# Patient Record
Sex: MALE | Race: WHITE | Employment: UNEMPLOYED | URBAN - METROPOLITAN AREA
[De-identification: names, ages, dates, MRNs, and addresses within clinical notes are randomized per-mention and may not be internally consistent; named-entity substitution may affect disease eponyms.]

---

## 2017-10-09 PROBLEM — K05.6 PERIODONTAL DISEASE: Status: ACTIVE | Noted: 2017-10-09

## 2017-10-09 PROBLEM — K02.9 DENTAL CARIES: Status: ACTIVE | Noted: 2017-10-09

## 2019-09-04 ENCOUNTER — APPOINTMENT (OUTPATIENT)
Dept: ULTRASOUND IMAGING | Age: 44
DRG: 660 | End: 2019-09-04
Payer: MEDICARE

## 2019-09-04 ENCOUNTER — HOSPITAL ENCOUNTER (INPATIENT)
Age: 44
LOS: 2 days | Discharge: NURSING, REHAB OR LONG-TERM CARE | DRG: 660 | End: 2019-09-06
Attending: EMERGENCY MEDICINE | Admitting: INTERNAL MEDICINE
Payer: MEDICARE

## 2019-09-04 PROBLEM — N17.9 ACUTE RENAL FAILURE (ARF) (HCC): Status: ACTIVE | Noted: 2019-09-04

## 2019-09-04 LAB
ANION GAP SERPL CALCULATED.3IONS-SCNC: 10 MMOL/L (ref 7–16)
BACTERIA: ABNORMAL /HPF
BASOPHILS ABSOLUTE: 0.06 E9/L (ref 0–0.2)
BASOPHILS RELATIVE PERCENT: 0.7 % (ref 0–2)
BILIRUBIN URINE: NEGATIVE
BLOOD, URINE: NEGATIVE
BUN BLDV-MCNC: 46 MG/DL (ref 6–20)
CALCIUM SERPL-MCNC: 9.2 MG/DL (ref 8.6–10.2)
CHLORIDE BLD-SCNC: 105 MMOL/L (ref 98–107)
CHLORIDE URINE RANDOM: 102 MMOL/L
CLARITY: CLEAR
CO2: 27 MMOL/L (ref 22–29)
COLOR: YELLOW
CREAT SERPL-MCNC: 3.1 MG/DL (ref 0.7–1.2)
CREATININE URINE: 54 MG/DL (ref 40–278)
EOSINOPHIL, URINE: 3 % (ref 0–1)
EOSINOPHILS ABSOLUTE: 0.27 E9/L (ref 0.05–0.5)
EOSINOPHILS RELATIVE PERCENT: 3.2 % (ref 0–6)
GFR AFRICAN AMERICAN: 27
GFR NON-AFRICAN AMERICAN: 22 ML/MIN/1.73
GLUCOSE BLD-MCNC: 87 MG/DL (ref 74–99)
GLUCOSE URINE: NEGATIVE MG/DL
HCT VFR BLD CALC: 39.9 % (ref 37–54)
HEMOGLOBIN: 12.9 G/DL (ref 12.5–16.5)
IMMATURE GRANULOCYTES #: 0.03 E9/L
IMMATURE GRANULOCYTES %: 0.4 % (ref 0–5)
KETONES, URINE: NEGATIVE MG/DL
LEUKOCYTE ESTERASE, URINE: ABNORMAL
LYMPHOCYTES ABSOLUTE: 1.35 E9/L (ref 1.5–4)
LYMPHOCYTES RELATIVE PERCENT: 15.8 % (ref 20–42)
MCH RBC QN AUTO: 29.4 PG (ref 26–35)
MCHC RBC AUTO-ENTMCNC: 32.3 % (ref 32–34.5)
MCV RBC AUTO: 90.9 FL (ref 80–99.9)
MONOCYTES ABSOLUTE: 0.59 E9/L (ref 0.1–0.95)
MONOCYTES RELATIVE PERCENT: 6.9 % (ref 2–12)
NEUTROPHILS ABSOLUTE: 6.26 E9/L (ref 1.8–7.3)
NEUTROPHILS RELATIVE PERCENT: 73 % (ref 43–80)
NITRITE, URINE: NEGATIVE
PDW BLD-RTO: 12.6 FL (ref 11.5–15)
PH UA: 6.5 (ref 5–9)
PLATELET # BLD: 256 E9/L (ref 130–450)
PMV BLD AUTO: 8.2 FL (ref 7–12)
POTASSIUM SERPL-SCNC: 5 MMOL/L (ref 3.5–5)
POTASSIUM, UR: 42.4 MMOL/L
PROTEIN UA: NEGATIVE MG/DL
RBC # BLD: 4.39 E12/L (ref 3.8–5.8)
RBC UA: ABNORMAL /HPF (ref 0–2)
SODIUM BLD-SCNC: 142 MMOL/L (ref 132–146)
SODIUM URINE: 103 MMOL/L
SPECIFIC GRAVITY UA: 1.01 (ref 1–1.03)
UROBILINOGEN, URINE: 0.2 E.U./DL
WBC # BLD: 8.6 E9/L (ref 4.5–11.5)
WBC UA: ABNORMAL /HPF (ref 0–5)

## 2019-09-04 PROCEDURE — 85025 COMPLETE CBC W/AUTO DIFF WBC: CPT

## 2019-09-04 PROCEDURE — 36415 COLL VENOUS BLD VENIPUNCTURE: CPT

## 2019-09-04 PROCEDURE — 2580000003 HC RX 258: Performed by: INTERNAL MEDICINE

## 2019-09-04 PROCEDURE — 76770 US EXAM ABDO BACK WALL COMP: CPT

## 2019-09-04 PROCEDURE — 84133 ASSAY OF URINE POTASSIUM: CPT

## 2019-09-04 PROCEDURE — 84300 ASSAY OF URINE SODIUM: CPT

## 2019-09-04 PROCEDURE — 99284 EMERGENCY DEPT VISIT MOD MDM: CPT

## 2019-09-04 PROCEDURE — 82436 ASSAY OF URINE CHLORIDE: CPT

## 2019-09-04 PROCEDURE — 80048 BASIC METABOLIC PNL TOTAL CA: CPT

## 2019-09-04 PROCEDURE — 87205 SMEAR GRAM STAIN: CPT

## 2019-09-04 PROCEDURE — 82570 ASSAY OF URINE CREATININE: CPT

## 2019-09-04 PROCEDURE — 81001 URINALYSIS AUTO W/SCOPE: CPT

## 2019-09-04 PROCEDURE — 6370000000 HC RX 637 (ALT 250 FOR IP): Performed by: INTERNAL MEDICINE

## 2019-09-04 PROCEDURE — 1200000000 HC SEMI PRIVATE

## 2019-09-04 PROCEDURE — 2580000003 HC RX 258: Performed by: STUDENT IN AN ORGANIZED HEALTH CARE EDUCATION/TRAINING PROGRAM

## 2019-09-04 RX ORDER — OYSTER SHELL CALCIUM WITH VITAMIN D 500; 200 MG/1; [IU]/1
1 TABLET, FILM COATED ORAL 2 TIMES DAILY
COMMUNITY

## 2019-09-04 RX ORDER — BISACODYL 10 MG
10 SUPPOSITORY, RECTAL RECTAL DAILY PRN
Status: DISCONTINUED | OUTPATIENT
Start: 2019-09-04 | End: 2019-09-06 | Stop reason: HOSPADM

## 2019-09-04 RX ORDER — 0.9 % SODIUM CHLORIDE 0.9 %
1000 INTRAVENOUS SOLUTION INTRAVENOUS ONCE
Status: COMPLETED | OUTPATIENT
Start: 2019-09-04 | End: 2019-09-04

## 2019-09-04 RX ORDER — SENNA AND DOCUSATE SODIUM 50; 8.6 MG/1; MG/1
2 TABLET, FILM COATED ORAL DAILY
COMMUNITY

## 2019-09-04 RX ORDER — SENNA AND DOCUSATE SODIUM 50; 8.6 MG/1; MG/1
2 TABLET, FILM COATED ORAL DAILY
Status: DISCONTINUED | OUTPATIENT
Start: 2019-09-04 | End: 2019-09-06 | Stop reason: HOSPADM

## 2019-09-04 RX ORDER — ACETAMINOPHEN 325 MG/1
650 TABLET ORAL EVERY 4 HOURS PRN
Status: DISCONTINUED | OUTPATIENT
Start: 2019-09-04 | End: 2019-09-06 | Stop reason: HOSPADM

## 2019-09-04 RX ORDER — FERROUS SULFATE 325(65) MG
325 TABLET ORAL
Status: DISCONTINUED | OUTPATIENT
Start: 2019-09-05 | End: 2019-09-05

## 2019-09-04 RX ORDER — BISACODYL 10 MG
10 SUPPOSITORY, RECTAL RECTAL DAILY PRN
COMMUNITY

## 2019-09-04 RX ORDER — LEVOTHYROXINE SODIUM 0.07 MG/1
75 TABLET ORAL
Status: DISCONTINUED | OUTPATIENT
Start: 2019-09-05 | End: 2019-09-06 | Stop reason: HOSPADM

## 2019-09-04 RX ORDER — OMEGA-3S/DHA/EPA/FISH OIL/D3 300MG-1000
400 CAPSULE ORAL
Status: DISCONTINUED | OUTPATIENT
Start: 2019-09-05 | End: 2019-09-04 | Stop reason: CLARIF

## 2019-09-04 RX ORDER — ACETAMINOPHEN 325 MG/1
650 TABLET ORAL EVERY 4 HOURS PRN
COMMUNITY

## 2019-09-04 RX ORDER — M-VIT,TX,IRON,MINS/CALC/FOLIC 27MG-0.4MG
1 TABLET ORAL
Status: DISCONTINUED | OUTPATIENT
Start: 2019-09-05 | End: 2019-09-06 | Stop reason: HOSPADM

## 2019-09-04 RX ORDER — ONDANSETRON 2 MG/ML
4 INJECTION INTRAMUSCULAR; INTRAVENOUS EVERY 6 HOURS PRN
Status: DISCONTINUED | OUTPATIENT
Start: 2019-09-04 | End: 2019-09-06 | Stop reason: HOSPADM

## 2019-09-04 RX ORDER — SODIUM CHLORIDE 9 MG/ML
INJECTION, SOLUTION INTRAVENOUS CONTINUOUS
Status: DISCONTINUED | OUTPATIENT
Start: 2019-09-04 | End: 2019-09-06 | Stop reason: HOSPADM

## 2019-09-04 RX ADMIN — SODIUM CHLORIDE: 9 INJECTION, SOLUTION INTRAVENOUS at 14:28

## 2019-09-04 RX ADMIN — OYSTER SHELL CALCIUM WITH VITAMIN D 1 TABLET: 500; 200 TABLET, FILM COATED ORAL at 20:44

## 2019-09-04 RX ADMIN — SODIUM CHLORIDE 1000 ML: 9 INJECTION, SOLUTION INTRAVENOUS at 12:36

## 2019-09-04 SDOH — HEALTH STABILITY: MENTAL HEALTH: HOW OFTEN DO YOU HAVE A DRINK CONTAINING ALCOHOL?: NEVER

## 2019-09-04 ASSESSMENT — PAIN SCALES - WONG BAKER
WONGBAKER_NUMERICALRESPONSE: 0
WONGBAKER_NUMERICALRESPONSE: 0

## 2019-09-04 ASSESSMENT — PAIN SCALES - GENERAL
PAINLEVEL_OUTOF10: 0
PAINLEVEL_OUTOF10: 0

## 2019-09-04 NOTE — ED PROVIDER NOTES
The patient is a 79-year-old male presenting with abnormal labs. Patient was reported to have outpatient labs drawn and was found to have an elevated BUN and creatinine. Patient has a significant past male history of anemia, cerebral palsy, impulse control disorder, MR, thyroid disease. Patient is present with caretaker in room who provides the history. She states patient is verbal only to a few words but otherwise nonresponsive. Does not follow commands. The caretaker denies any recent illness, diarrhea, urinary symptoms, vomiting, changes in appetite. The history is limited by a developmental delay. Review of Systems   Unable to perform ROS: Patient nonverbal        Physical Exam   Constitutional: He is oriented to person, place, and time. He appears well-developed and well-nourished. No distress. HENT:   Head: Normocephalic and atraumatic. Eyes: Pupils are equal, round, and reactive to light. Conjunctivae and EOM are normal.   Neck: Normal range of motion. No JVD present. No thyromegaly present. Cardiovascular: Normal rate, regular rhythm, normal heart sounds and intact distal pulses. Pulmonary/Chest: Effort normal and breath sounds normal. No respiratory distress. He has no wheezes. He exhibits no tenderness. Abdominal: Soft. Bowel sounds are normal. He exhibits no distension and no mass. There is no tenderness. There is no rebound and no guarding. Musculoskeletal: Normal range of motion. Neurological: He is alert and oriented to person, place, and time. Skin: Skin is warm and dry. No rash noted. Psychiatric: He has a normal mood and affect. His behavior is normal.   Nursing note and vitals reviewed.        Procedures     MDM     ED Course as of Sep 05 2341   Wed Sep 04, 2019   1106   ATTENDING PROVIDER ATTESTATION:     I have personally performed and/or participated in the history, exam, medical decision making, and procedures and agree with all pertinent clinical information unless otherwise noted. I have also reviewed and agree with the past medical, family and social history unless otherwise noted. I have discussed this patient in detail with the resident and provided the instruction and education regarding the evidence-based evaluation and treatment of [unfilled]  History: patient sent to the ED with concern for an abnormal lab. His family is not sure of the abnormality and have not noticed any recent issues. Patient is nonverbal.    My findings: Victorino Kocher is a 40 y.o. male whom is in no distress. Physical exam reveals awake and alert male. No obvious pallor. Mucous membranes are moist.  Heart RRR, lungs CTA, abdomen is soft and nontender. He moves all extremities spontaneously. My plan: Symptomatic and supportive care. Will evaluate labs and call the institution he lives in. Electronically signed by Sabine Cortes DO on 9/4/19 at 11:06 AM          [JS]   [phone] Spoke with Issa Law, the nurse at the facility that takes care of the patient, she states that his BUN in June was 19, creatinine 1.2, GFR 66.  Reason the sentiment was his BUN was drawn at 60, creatinine was 5.2 and GFR was 12. [WL]   [phone] with Dr. Eliza Rojas, patient's primary care physician, he states the patient can be brought in and to bring him in under Dr. Thaddeus Leung. [WL]   1150 Creatinine(!): 3.1 [WL]   1150 BUN(!): 46 [WL]   1248 Spoke with Dr. Thaddeus Leung, he agrees to accept the patient for admission.    [WL]      ED Course User Index  [JS] Sabine Cortes DO  [WL] Toya Limon DO      Patient presents to the ED for   Chief Complaint   Patient presents with    Abnormal Lab     patients BUN and Cr are elevated     Chart review was performed. Workup in the ED revealed SONA. Lab work showed creatinine was 3.1, BUN 46. Previous values of 1.2 for creatinine back in June. Values of 5.2 were found as outpatient basis and caused him to come here to the ED.   Patient was started on IV fluids. Patient requires continued workup and management of their symptoms and will be admitted to the hospital for further evaluation and treatment. ED Course as of Sep 05 2341   Wed Sep 04, 2019   1106   ATTENDING PROVIDER ATTESTATION:     I have personally performed and/or participated in the history, exam, medical decision making, and procedures and agree with all pertinent clinical information unless otherwise noted. I have also reviewed and agree with the past medical, family and social history unless otherwise noted. I have discussed this patient in detail with the resident and provided the instruction and education regarding the evidence-based evaluation and treatment of [unfilled]  History: patient sent to the ED with concern for an abnormal lab. His family is not sure of the abnormality and have not noticed any recent issues. Patient is nonverbal.    My findings: Elmer Dodge is a 40 y.o. male whom is in no distress. Physical exam reveals awake and alert male. No obvious pallor. Mucous membranes are moist.  Heart RRR, lungs CTA, abdomen is soft and nontender. He moves all extremities spontaneously. My plan: Symptomatic and supportive care. Will evaluate labs and call the institution he lives in. Electronically signed by Samuel Villaseñor DO on 9/4/19 at 11:06 AM          [JS]   [phone] Spoke with Hilda Santana, the nurse at the facility that takes care of the patient, she states that his BUN in June was 19, creatinine 1.2, GFR 66.  Reason the sentiment was his BUN was drawn at 60, creatinine was 5.2 and GFR was 12. [WL]   [phone] with Dr. Casandra De Los Santos, patient's primary care physician, he states the patient can be brought in and to bring him in under Dr. Criss Varma.     [WL]   1150 Creatinine(!): 3.1 [WL]   1150 BUN(!): 46 [WL]   1248 Spoke with Dr. Criss Varma, he agrees to accept the patient for admission.    [WL]      ED Course User Index  [JS] Samuel Villaseñor Glucose 87 74 - 99 mg/dL    BUN 46 (H) 6 - 20 mg/dL    CREATININE 3.1 (H) 0.7 - 1.2 mg/dL    GFR Non-African American 22 >=60 mL/min/1.73    GFR African American 27     Calcium 9.2 8.6 - 10.2 mg/dL   Urinalysis with Microscopic   Result Value Ref Range    Color, UA Yellow Straw/Yellow    Clarity, UA Clear Clear    Glucose, Ur Negative Negative mg/dL    Bilirubin Urine Negative Negative    Ketones, Urine Negative Negative mg/dL    Specific Gravity, UA 1.010 1.005 - 1.030    Blood, Urine Negative Negative    pH, UA 6.5 5.0 - 9.0    Protein, UA Negative Negative mg/dL    Urobilinogen, Urine 0.2 <2.0 E.U./dL    Nitrite, Urine Negative Negative    Leukocyte Esterase, Urine SMALL (A) Negative    WBC, UA 0-1 0 - 5 /HPF    RBC, UA 1-3 0 - 2 /HPF    Bacteria, UA RARE (A) /HPF   Eosinophil smear urine   Result Value Ref Range    Eosinophil, Urine 3 (H) 0 - 1 %   Creatinine, urine, random   Result Value Ref Range    Creatinine, Ur 54 40 - 278 mg/dL   Urine electrolytes   Result Value Ref Range    Sodium, Ur 103 Not Established mmol/L    Potassium, Ur 42.4 Not Established mmol/L    Chloride 102 Not Established mmol/L   TSH without Reflex   Result Value Ref Range    TSH 7.840 (H) 0.270 - 4.200 uIU/mL   Lipid Panel   Result Value Ref Range    Cholesterol, Total 165 0 - 199 mg/dL    Triglycerides 69 0 - 149 mg/dL    HDL 44 >40 mg/dL    LDL Calculated 107 (H) 0 - 99 mg/dL    VLDL Cholesterol Calculated 14 mg/dL   Phosphorus   Result Value Ref Range    Phosphorus 2.4 (L) 2.5 - 4.5 mg/dL   CBC Auto Differential   Result Value Ref Range    WBC 8.0 4.5 - 11.5 E9/L    RBC 4.47 3.80 - 5.80 E12/L    Hemoglobin 13.1 12.5 - 16.5 g/dL    Hematocrit 40.5 37.0 - 54.0 %    MCV 90.6 80.0 - 99.9 fL    MCH 29.3 26.0 - 35.0 pg    MCHC 32.3 32.0 - 34.5 %    RDW 12.5 11.5 - 15.0 fL    Platelets 947 077 - 898 E9/L    MPV 8.5 7.0 - 12.0 fL    Neutrophils % 69.4 43.0 - 80.0 %    Immature Granulocytes % 0.4 0.0 - 5.0 %    Lymphocytes % 21.2 20.0 - 42.0 % 09/05/19 1437 103/74 -- -- 67 17 93 %   09/05/19 1428 100/67 -- -- 74 17 97 %   09/05/19 1420 126/85 -- -- 69 25 97 %   09/05/19 1415 126/85 97 °F (36.1 °C) Temporal 77 16 97 %   09/05/19 0720 (!) 134/93 98 °F (36.7 °C) -- 81 18 97 %   09/05/19 0000 -- -- -- -- -- 98 %           ------------------------------------------ PROGRESS NOTES ------------------------------------------  Re-evaluation(s):  Time: 11:41 PM   Patients symptoms show no change  Repeat physical examination is unchanged, patient laying comfortably in the bed. Counseling:  I have spoken with the patient/family members and discussed todays results, in addition to providing specific details for the plan of care and counseling regarding the diagnosis and prognosis. Their questions are answered at this time and they are agreeable with the plan of admission. This patient has remained hemodynamically stable during their ED course. Diagnosis:  1.  SONA (acute kidney injury) ([address]        Disposition:  Patient's disposition: Arlet Canales DO  Resident  09/05/19 0068

## 2019-09-04 NOTE — CONSULTS
Patient seen and examined. Consult dictated. Dr. Marie Sibley , Thank You for allowing me to participate in the care of this patient. Will follow the patient with you.     Claudia Badillo MD  Nephrology    Electronically signed by Richar Weiss MD on 9/4/2019 at 4:09 PM

## 2019-09-04 NOTE — H&P
[facility]  Internal Medicine  -Resident History & Physical NoteJohn Rodriguez / ELADIO 1975 / MRN [id number] /  / Ghislaine Delgadillo 2019    PCP:  Susan Bernal DO  Admitting Physician:  No admitting provider for patient encounter. Consultants:  None at this time   Chief Complaint   Patient presents with    Abnormal Lab     patients BUN and Cr are elevated      History of Present Illness  Lawyer Mahmood is a 40 y.o. male with a past medical history pertinent for cerebral palsy, profound mental retardation, hypothyroidism, and chronic anemia [facility] to elevated creatinine on outpatient labs. Patient is mostly non-verbal and unable to provide a history. Patient's mother at the bedside to provide history. She reports that the patient has been asymptomatic aside from increased arthritis pain and difficultly ambulating. He had routine labs drawn as an outpatient showing a significantly elevated creatinine. Patient has been taking naproxen 500mg daily for several years due to his arthritis. Patient has had no vomiting or diarrhea, and his appetite is good with no decreased intake recently. Patient's father sees Dr. Lorena James for nephrology and his mother has requested he be consulted if needed as he is known to their family. Histories / Home Medications / Allergies  Past Medical History:   Diagnosis Date    Anemia     Cerebral palsy ([address]     Impulse control disorder     Profound mental retardation     Thyroid disease      Past Surgical History:   Procedure Laterality Date    DENTAL SURGERY  10/09/2017    dental restorations/Dr. Ravi Poole     History reviewed. No pertinent family history.   Social History     Socioeconomic History    Marital status: Single     Spouse name: Not on file    Number of children: Not on file    Years of education: Not on file    Highest education level: Not on file   Occupational History    Not on file   Social Needs    Financial resource strain: Not on file    Food insecurity:     Worry: Not on file     Inability: Not on file    Transportation needs:     Medical: Not on file     Non-medical: Not on file   Tobacco Use    Smoking status: Never Smoker    Smokeless tobacco: Never Used   Substance and Sexual Activity    Alcohol use: Never     Frequency: Never    Drug use: Never    Sexual activity: Never   Lifestyle    Physical activity:     Days per week: Not on file     Minutes per session: Not on file    Stress: Not on file   Relationships    Social connections:     Talks on phone: Not on file     Gets together: Not on file     Attends Holiness service: Not on file     Active member of club or organization: Not on file     Attends meetings of clubs or organizations: Not on file     Relationship status: Not on file    Intimate partner violence:     Fear of current or ex partner: Not on file     Emotionally abused: Not on file     Physically abused: Not on file     Forced sexual activity: Not on file   Other Topics Concern    Not on file   Social History Narrative    Not on file     Prior to Admission medications    Medication Sig Start Date End Date Taking?  Authorizing Provider   calcium-vitamin D (OSCAL-500) 500-200 MG-UNIT per tablet Take 1 tablet by mouth 2 times daily   Yes Historical Provider, MD   sennosides-docusate sodium (SENOKOT-S) 8.6-50 MG tablet Take 2 tablets by mouth daily   Yes Historical Provider, MD   acetaminophen (TYLENOL) 325 MG tablet Take 650 mg by mouth every 4 hours as needed for Pain   Yes Historical Provider, MD   bisacodyl (DULCOLAX) 10 MG suppository Place 10 mg rectally daily as needed for Constipation   Yes Historical Provider, MD   levothyroxine (SYNTHROID) 75 MCG tablet Take 75 mcg by mouth every morning (before breakfast)    Yes Historical Provider, MD   naproxen (NAPROSYN) 500 MG tablet Take 500 mg by mouth every morning (before breakfast)    Yes Historical Provider, MD   Multiple Vitamin (MULTI VITAMIN) TABS Take 1 tablet by mouth every morning (before breakfast)    Yes Historical Provider, MD   vitamin D3 (CHOLECALCIFEROL) 400 units TABS tablet Take 400 Units by mouth every morning (before breakfast)    Yes Historical Provider, MD   ferrous sulfate 325 (65 Fe) MG tablet Take 325 mg by mouth daily (with breakfast)   Yes Historical Provider, MD   magnesium hydroxide (MILK OF MAGNESIA) 400 MG/5ML suspension Take 30 mLs by mouth daily as needed for Constipation   Yes Historical Provider, MD     Allergies: Erythromycin and Macrolides and ketolides    Review of Systems  All bolded are positive; please see HPI  Unable to obtain as patient is non-verbal and non-communicative    Physical Examination  BP (!) 139/116   Pulse 76   Temp 97.1 °F (36.2 °C) (Axillary)   Resp 18   Ht 5' 6\" (1.676 m)   Wt 154 lb (69.9 kg)   SpO2 99%   BMI 24.86 kg/m²   General: patient is awake, alert, and at his baseline functional status - nonverbal; they appear stated age and are cooperative during the examination; they are in no apparent distress and do not exhibit any labored breathing at this time  HEENT: unremarkable  Neck: supple with trachea midline and without obvious JVD or bruit  Cardiac: regular rate and rhythm without obvious murmur, rub, or gallop  Lungs: clear to auscultation bilaterally without wheezes, rhonchi, or rales  Abdomen: soft, nontender, and nondistended with normal active bowel sounds and without organomegaly  Extremities: atraumatic, without ulcers or edema  Neurologic: mental status is as above, cranial nerves are grossly intact, the patient moves all extremities spontaneously, and no focal deficits are appreciated on exam    Recent vitals, labs, and imaging results have been reviewed and are available in the electronic medical record. Assessment and Plan  Patient is a 40 y.o. male who presented with abnormal labs.   The active problem list is as follows:    Acute renal failure  Hx of cerebral palsy   Profound mental retardation   Hypothyroidism  Arthritis    -admitted to [facility]  -IV fluids, NS at 125ml/hr  -renal US  -urine electrolytes  -nephrology consulted  -strict I/Os  -home medications reviewed    · DVT prophylaxis with heparin. · The case and plan of care were discussed with Dr. Edward Murcia. Sonny Garcia DO, PGY3  9/4/2019  1:02 PM    The chart was reviewed and the patient was seen and examined with the resident. The case was discussed in detail with the resident and agree with current impressions and plan. Case was discussed with the patient's family at the bedside. Consult Dr. Josiah Mojica who follows up with the patient's father.     Liliya Kline D.O.  2:34 PM  9/4/2019

## 2019-09-05 ENCOUNTER — ANESTHESIA EVENT (OUTPATIENT)
Dept: OPERATING ROOM | Age: 44
DRG: 660 | End: 2019-09-05
Payer: MEDICARE

## 2019-09-05 ENCOUNTER — APPOINTMENT (OUTPATIENT)
Dept: GENERAL RADIOLOGY | Age: 44
DRG: 660 | End: 2019-09-05
Payer: MEDICARE

## 2019-09-05 ENCOUNTER — APPOINTMENT (OUTPATIENT)
Dept: CT IMAGING | Age: 44
DRG: 660 | End: 2019-09-05
Payer: MEDICARE

## 2019-09-05 ENCOUNTER — ANESTHESIA (OUTPATIENT)
Dept: OPERATING ROOM | Age: 44
DRG: 660 | End: 2019-09-05
Payer: MEDICARE

## 2019-09-05 VITALS
SYSTOLIC BLOOD PRESSURE: 102 MMHG | DIASTOLIC BLOOD PRESSURE: 77 MMHG | RESPIRATION RATE: 19 BRPM | OXYGEN SATURATION: 98 %

## 2019-09-05 LAB
ANION GAP SERPL CALCULATED.3IONS-SCNC: 11 MMOL/L (ref 7–16)
BASOPHILS ABSOLUTE: 0.05 E9/L (ref 0–0.2)
BASOPHILS RELATIVE PERCENT: 0.6 % (ref 0–2)
BUN BLDV-MCNC: 17 MG/DL (ref 6–20)
CALCIUM SERPL-MCNC: 8.7 MG/DL (ref 8.6–10.2)
CHLORIDE BLD-SCNC: 104 MMOL/L (ref 98–107)
CHOLESTEROL, TOTAL: 165 MG/DL (ref 0–199)
CO2: 24 MMOL/L (ref 22–29)
CREAT SERPL-MCNC: 1.2 MG/DL (ref 0.7–1.2)
EOSINOPHILS ABSOLUTE: 0.16 E9/L (ref 0.05–0.5)
EOSINOPHILS RELATIVE PERCENT: 2 % (ref 0–6)
GFR AFRICAN AMERICAN: >60
GFR NON-AFRICAN AMERICAN: >60 ML/MIN/1.73
GLUCOSE BLD-MCNC: 97 MG/DL (ref 74–99)
HCT VFR BLD CALC: 40.5 % (ref 37–54)
HDLC SERPL-MCNC: 44 MG/DL
HEMOGLOBIN: 13.1 G/DL (ref 12.5–16.5)
IMMATURE GRANULOCYTES #: 0.03 E9/L
IMMATURE GRANULOCYTES %: 0.4 % (ref 0–5)
LDL CHOLESTEROL CALCULATED: 107 MG/DL (ref 0–99)
LYMPHOCYTES ABSOLUTE: 1.7 E9/L (ref 1.5–4)
LYMPHOCYTES RELATIVE PERCENT: 21.2 % (ref 20–42)
MCH RBC QN AUTO: 29.3 PG (ref 26–35)
MCHC RBC AUTO-ENTMCNC: 32.3 % (ref 32–34.5)
MCV RBC AUTO: 90.6 FL (ref 80–99.9)
MONOCYTES ABSOLUTE: 0.51 E9/L (ref 0.1–0.95)
MONOCYTES RELATIVE PERCENT: 6.4 % (ref 2–12)
NEUTROPHILS ABSOLUTE: 5.58 E9/L (ref 1.8–7.3)
NEUTROPHILS RELATIVE PERCENT: 69.4 % (ref 43–80)
PDW BLD-RTO: 12.5 FL (ref 11.5–15)
PHOSPHORUS: 2.4 MG/DL (ref 2.5–4.5)
PLATELET # BLD: 250 E9/L (ref 130–450)
PMV BLD AUTO: 8.5 FL (ref 7–12)
POTASSIUM SERPL-SCNC: 4 MMOL/L (ref 3.5–5)
RBC # BLD: 4.47 E12/L (ref 3.8–5.8)
SODIUM BLD-SCNC: 139 MMOL/L (ref 132–146)
T4 FREE: 1.25 NG/DL (ref 0.93–1.7)
TRIGL SERPL-MCNC: 69 MG/DL (ref 0–149)
TSH SERPL DL<=0.05 MIU/L-ACNC: 7.84 UIU/ML (ref 0.27–4.2)
VLDLC SERPL CALC-MCNC: 14 MG/DL
WBC # BLD: 8 E9/L (ref 4.5–11.5)

## 2019-09-05 PROCEDURE — 80061 LIPID PANEL: CPT

## 2019-09-05 PROCEDURE — 6360000002 HC RX W HCPCS: Performed by: NURSE ANESTHETIST, CERTIFIED REGISTERED

## 2019-09-05 PROCEDURE — 85025 COMPLETE CBC W/AUTO DIFF WBC: CPT

## 2019-09-05 PROCEDURE — 36415 COLL VENOUS BLD VENIPUNCTURE: CPT

## 2019-09-05 PROCEDURE — 2709999900 HC NON-CHARGEABLE SUPPLY: Performed by: UROLOGY

## 2019-09-05 PROCEDURE — BT141ZZ FLUOROSCOPY OF KIDNEYS, URETERS AND BLADDER USING LOW OSMOLAR CONTRAST: ICD-10-PCS | Performed by: UROLOGY

## 2019-09-05 PROCEDURE — 1200000000 HC SEMI PRIVATE

## 2019-09-05 PROCEDURE — C1894 INTRO/SHEATH, NON-LASER: HCPCS | Performed by: UROLOGY

## 2019-09-05 PROCEDURE — 74400 UROGRAPHY IV +-KUB TOMOG: CPT

## 2019-09-05 PROCEDURE — 6360000002 HC RX W HCPCS: Performed by: UROLOGY

## 2019-09-05 PROCEDURE — 6360000002 HC RX W HCPCS: Performed by: INTERNAL MEDICINE

## 2019-09-05 PROCEDURE — 2500000003 HC RX 250 WO HCPCS: Performed by: NURSE ANESTHETIST, CERTIFIED REGISTERED

## 2019-09-05 PROCEDURE — 7100000001 HC PACU RECOVERY - ADDTL 15 MIN: Performed by: UROLOGY

## 2019-09-05 PROCEDURE — 6370000000 HC RX 637 (ALT 250 FOR IP): Performed by: UROLOGY

## 2019-09-05 PROCEDURE — 7100000000 HC PACU RECOVERY - FIRST 15 MIN: Performed by: UROLOGY

## 2019-09-05 PROCEDURE — 84100 ASSAY OF PHOSPHORUS: CPT

## 2019-09-05 PROCEDURE — 2720000010 HC SURG SUPPLY STERILE: Performed by: UROLOGY

## 2019-09-05 PROCEDURE — 80048 BASIC METABOLIC PNL TOTAL CA: CPT

## 2019-09-05 PROCEDURE — 3700000000 HC ANESTHESIA ATTENDED CARE: Performed by: UROLOGY

## 2019-09-05 PROCEDURE — 0TF68ZZ FRAGMENTATION IN RIGHT URETER, VIA NATURAL OR ARTIFICIAL OPENING ENDOSCOPIC: ICD-10-PCS | Performed by: UROLOGY

## 2019-09-05 PROCEDURE — 84439 ASSAY OF FREE THYROXINE: CPT

## 2019-09-05 PROCEDURE — 3600000003 HC SURGERY LEVEL 3 BASE: Performed by: UROLOGY

## 2019-09-05 PROCEDURE — 74176 CT ABD & PELVIS W/O CONTRAST: CPT

## 2019-09-05 PROCEDURE — 84443 ASSAY THYROID STIM HORMONE: CPT

## 2019-09-05 PROCEDURE — C2617 STENT, NON-COR, TEM W/O DEL: HCPCS | Performed by: UROLOGY

## 2019-09-05 PROCEDURE — C1769 GUIDE WIRE: HCPCS | Performed by: UROLOGY

## 2019-09-05 PROCEDURE — 3700000001 HC ADD 15 MINUTES (ANESTHESIA): Performed by: UROLOGY

## 2019-09-05 PROCEDURE — C1758 CATHETER, URETERAL: HCPCS | Performed by: UROLOGY

## 2019-09-05 PROCEDURE — 2580000003 HC RX 258: Performed by: NURSE ANESTHETIST, CERTIFIED REGISTERED

## 2019-09-05 PROCEDURE — 0T788DZ DILATION OF BILATERAL URETERS WITH INTRALUMINAL DEVICE, VIA NATURAL OR ARTIFICIAL OPENING ENDOSCOPIC: ICD-10-PCS | Performed by: UROLOGY

## 2019-09-05 PROCEDURE — 3600000013 HC SURGERY LEVEL 3 ADDTL 15MIN: Performed by: UROLOGY

## 2019-09-05 DEVICE — UNIVERSA FIRM URETERAL STENT AND POSITIONER WITH HYDROPHILIC COATING
Type: IMPLANTABLE DEVICE | Status: FUNCTIONAL
Brand: UNIVERSA

## 2019-09-05 RX ORDER — PROMETHAZINE HYDROCHLORIDE 25 MG/ML
25 INJECTION, SOLUTION INTRAMUSCULAR; INTRAVENOUS EVERY 6 HOURS PRN
Status: DISCONTINUED | OUTPATIENT
Start: 2019-09-05 | End: 2019-09-06 | Stop reason: HOSPADM

## 2019-09-05 RX ORDER — ATROPA BELLADONNA AND OPIUM 16.2; 6 MG/1; MG/1
SUPPOSITORY RECTAL PRN
Status: DISCONTINUED | OUTPATIENT
Start: 2019-09-05 | End: 2019-09-05 | Stop reason: HOSPADM

## 2019-09-05 RX ORDER — MIDAZOLAM HYDROCHLORIDE 1 MG/ML
INJECTION INTRAMUSCULAR; INTRAVENOUS PRN
Status: DISCONTINUED | OUTPATIENT
Start: 2019-09-05 | End: 2019-09-05 | Stop reason: SDUPTHER

## 2019-09-05 RX ORDER — PROPOFOL 10 MG/ML
INJECTION, EMULSION INTRAVENOUS CONTINUOUS PRN
Status: DISCONTINUED | OUTPATIENT
Start: 2019-09-05 | End: 2019-09-05 | Stop reason: SDUPTHER

## 2019-09-05 RX ORDER — FENTANYL CITRATE 50 UG/ML
INJECTION, SOLUTION INTRAMUSCULAR; INTRAVENOUS PRN
Status: DISCONTINUED | OUTPATIENT
Start: 2019-09-05 | End: 2019-09-05 | Stop reason: SDUPTHER

## 2019-09-05 RX ORDER — MORPHINE SULFATE 2 MG/ML
2 INJECTION, SOLUTION INTRAMUSCULAR; INTRAVENOUS EVERY 5 MIN PRN
Status: DISCONTINUED | OUTPATIENT
Start: 2019-09-05 | End: 2019-09-05 | Stop reason: HOSPADM

## 2019-09-05 RX ORDER — KETOROLAC TROMETHAMINE 30 MG/ML
INJECTION, SOLUTION INTRAMUSCULAR; INTRAVENOUS PRN
Status: DISCONTINUED | OUTPATIENT
Start: 2019-09-05 | End: 2019-09-05 | Stop reason: SDUPTHER

## 2019-09-05 RX ORDER — LIDOCAINE HYDROCHLORIDE 20 MG/ML
INJECTION, SOLUTION INFILTRATION; PERINEURAL PRN
Status: DISCONTINUED | OUTPATIENT
Start: 2019-09-05 | End: 2019-09-05 | Stop reason: SDUPTHER

## 2019-09-05 RX ORDER — HYDROMORPHONE HYDROCHLORIDE 1 MG/ML
0.5 INJECTION, SOLUTION INTRAMUSCULAR; INTRAVENOUS; SUBCUTANEOUS EVERY 5 MIN PRN
Status: DISCONTINUED | OUTPATIENT
Start: 2019-09-05 | End: 2019-09-05 | Stop reason: HOSPADM

## 2019-09-05 RX ORDER — CIPROFLOXACIN 2 MG/ML
INJECTION, SOLUTION INTRAVENOUS
Status: COMPLETED
Start: 2019-09-05 | End: 2019-09-05

## 2019-09-05 RX ORDER — PROMETHAZINE HYDROCHLORIDE 25 MG/ML
6.25 INJECTION, SOLUTION INTRAMUSCULAR; INTRAVENOUS
Status: DISCONTINUED | OUTPATIENT
Start: 2019-09-05 | End: 2019-09-05 | Stop reason: HOSPADM

## 2019-09-05 RX ORDER — CIPROFLOXACIN 2 MG/ML
400 INJECTION, SOLUTION INTRAVENOUS
Status: COMPLETED | OUTPATIENT
Start: 2019-09-05 | End: 2019-09-05

## 2019-09-05 RX ORDER — KETAMINE HYDROCHLORIDE 10 MG/ML
INJECTION, SOLUTION INTRAMUSCULAR; INTRAVENOUS PRN
Status: DISCONTINUED | OUTPATIENT
Start: 2019-09-05 | End: 2019-09-05 | Stop reason: SDUPTHER

## 2019-09-05 RX ORDER — PHENAZOPYRIDINE HYDROCHLORIDE 100 MG/1
100 TABLET, FILM COATED ORAL
Status: DISCONTINUED | OUTPATIENT
Start: 2019-09-05 | End: 2019-09-06 | Stop reason: HOSPADM

## 2019-09-05 RX ORDER — SODIUM CHLORIDE, SODIUM LACTATE, POTASSIUM CHLORIDE, CALCIUM CHLORIDE 600; 310; 30; 20 MG/100ML; MG/100ML; MG/100ML; MG/100ML
INJECTION, SOLUTION INTRAVENOUS CONTINUOUS PRN
Status: DISCONTINUED | OUTPATIENT
Start: 2019-09-05 | End: 2019-09-05 | Stop reason: SDUPTHER

## 2019-09-05 RX ORDER — MEPERIDINE HYDROCHLORIDE 50 MG/ML
12.5 INJECTION INTRAMUSCULAR; INTRAVENOUS; SUBCUTANEOUS EVERY 5 MIN PRN
Status: DISCONTINUED | OUTPATIENT
Start: 2019-09-05 | End: 2019-09-05 | Stop reason: HOSPADM

## 2019-09-05 RX ORDER — TAMSULOSIN HYDROCHLORIDE 0.4 MG/1
0.4 CAPSULE ORAL DAILY
Status: DISCONTINUED | OUTPATIENT
Start: 2019-09-05 | End: 2019-09-06 | Stop reason: HOSPADM

## 2019-09-05 RX ORDER — ONDANSETRON 2 MG/ML
4 INJECTION INTRAMUSCULAR; INTRAVENOUS
Status: DISCONTINUED | OUTPATIENT
Start: 2019-09-05 | End: 2019-09-05 | Stop reason: HOSPADM

## 2019-09-05 RX ORDER — MIDAZOLAM HYDROCHLORIDE 1 MG/ML
INJECTION INTRAMUSCULAR; INTRAVENOUS
Status: COMPLETED
Start: 2019-09-05 | End: 2019-09-05

## 2019-09-05 RX ORDER — PROPOFOL 10 MG/ML
INJECTION, EMULSION INTRAVENOUS PRN
Status: DISCONTINUED | OUTPATIENT
Start: 2019-09-05 | End: 2019-09-05 | Stop reason: SDUPTHER

## 2019-09-05 RX ADMIN — ONDANSETRON 4 MG: 2 INJECTION INTRAMUSCULAR; INTRAVENOUS at 21:17

## 2019-09-05 RX ADMIN — PROPOFOL 40 MG: 10 INJECTION, EMULSION INTRAVENOUS at 12:58

## 2019-09-05 RX ADMIN — PROMETHAZINE HYDROCHLORIDE 25 MG: 25 INJECTION INTRAMUSCULAR; INTRAVENOUS at 22:39

## 2019-09-05 RX ADMIN — FENTANYL CITRATE 50 MCG: 50 INJECTION, SOLUTION INTRAMUSCULAR; INTRAVENOUS at 13:14

## 2019-09-05 RX ADMIN — MIDAZOLAM 2 MG: 1 INJECTION INTRAMUSCULAR; INTRAVENOUS at 12:40

## 2019-09-05 RX ADMIN — KETOROLAC TROMETHAMINE 30 MG: 30 INJECTION, SOLUTION INTRAMUSCULAR; INTRAVENOUS at 13:59

## 2019-09-05 RX ADMIN — MIDAZOLAM 2 MG: 1 INJECTION INTRAMUSCULAR; INTRAVENOUS at 12:35

## 2019-09-05 RX ADMIN — LIDOCAINE HYDROCHLORIDE 20 MG: 20 INJECTION, SOLUTION INFILTRATION; PERINEURAL at 12:58

## 2019-09-05 RX ADMIN — TAMSULOSIN HYDROCHLORIDE 0.4 MG: 0.4 CAPSULE ORAL at 15:56

## 2019-09-05 RX ADMIN — ACETAMINOPHEN 650 MG: 325 TABLET, FILM COATED ORAL at 15:56

## 2019-09-05 RX ADMIN — PROPOFOL 100 MCG/KG/MIN: 10 INJECTION, EMULSION INTRAVENOUS at 12:58

## 2019-09-05 RX ADMIN — KETAMINE HYDROCHLORIDE 30 MG: 10 INJECTION, SOLUTION INTRAMUSCULAR; INTRAVENOUS at 12:52

## 2019-09-05 RX ADMIN — OYSTER SHELL CALCIUM WITH VITAMIN D 1 TABLET: 500; 200 TABLET, FILM COATED ORAL at 20:32

## 2019-09-05 RX ADMIN — SODIUM CHLORIDE, POTASSIUM CHLORIDE, SODIUM LACTATE AND CALCIUM CHLORIDE: 600; 310; 30; 20 INJECTION, SOLUTION INTRAVENOUS at 12:57

## 2019-09-05 RX ADMIN — CIPROFLOXACIN 400 MG: 2 INJECTION, SOLUTION INTRAVENOUS at 12:57

## 2019-09-05 RX ADMIN — FENTANYL CITRATE 50 MCG: 50 INJECTION, SOLUTION INTRAMUSCULAR; INTRAVENOUS at 13:05

## 2019-09-05 RX ADMIN — PHENAZOPYRIDINE HYDROCHLORIDE 100 MG: 100 TABLET ORAL at 17:19

## 2019-09-05 ASSESSMENT — PULMONARY FUNCTION TESTS
PIF_VALUE: 1

## 2019-09-05 ASSESSMENT — LIFESTYLE VARIABLES: SMOKING_STATUS: 0

## 2019-09-05 ASSESSMENT — PAIN SCALES - WONG BAKER
WONGBAKER_NUMERICALRESPONSE: 2
WONGBAKER_NUMERICALRESPONSE: 0

## 2019-09-05 ASSESSMENT — PAIN SCALES - GENERAL
PAINLEVEL_OUTOF10: 0
PAINLEVEL_OUTOF10: 0
PAINLEVEL_OUTOF10: 3
PAINLEVEL_OUTOF10: 0
PAINLEVEL_OUTOF10: 0

## 2019-09-05 ASSESSMENT — PAIN DESCRIPTION - LOCATION
LOCATION: GROIN
LOCATION: GROIN

## 2019-09-05 ASSESSMENT — PAIN DESCRIPTION - PAIN TYPE
TYPE: SURGICAL PAIN
TYPE: SURGICAL PAIN

## 2019-09-05 NOTE — BRIEF OP NOTE
Brief Postoperative Note  ______________________________________________________________    Patient: Josy Headley  YOB: 1975  MRN: [id number]  Date of Procedure: 9/5/2019    Pre-Op Diagnosis: OBSTRUCTIVE UROPATHY/azotemia/bilateral obstructing ureteral calculi    Post-Op Diagnosis: 12mm stone impacted at right pelvic brim/5mm sone left proximal ureter       Procedure(s):  CYSTOSCOPY URETEROSCOPY RETROGRADE PYELOGRAMS BILATERAL STENT INSERTION/right ureteroscopy right laser lithotripsy    Anesthesia: Monitored anesthesia and a B&O suppository    Surgeon(s):  Omid Santacruz MD    Assistant:     Estimated Blood Loss (mL):    Complications: Stone impacted at the pelvic brim right could not get by it with a stent I had to break the stone with ureteroscopy and laser lithotripsy in order to get into the proximal ureter and place a stent.   Pt combative after IV versed in preop  Finally sedated with ketamine    Specimens:   Urine for culture from the bladder    Implants:none        Drains:  Bilateral universa stents    Findings: As above    Omid Santacruz MD  Date: 9/5/2019  Time: 9:59 AM

## 2019-09-05 NOTE — PROGRESS NOTES
[facility]  Internal Medicine  -Proress Note-     Compa Nichols /  1975 / MRN [id number] / [id number] / Kofi Jesus 2019    PCP:  Naresh Ramsey DO  Admitting Physician:  Tru Horowitz DO  Consultants:  None at this time   Chief Complaint   Patient presents with    Abnormal Lab     patients BUN and Cr are elevated      History of Present Illness  Compa Nichols is a 40 y.o. male with a past medical history pertinent for cerebral palsy, profound mental retardation, hypothyroidism, and chronic anemia who presents to [facility] due to elevated creatinine on outpatient labs. Patient is mostly non-verbal and unable to provide a history. Patient's mother at the bedside to provide history. She reports that the patient has been asymptomatic aside from increased arthritis pain and difficultly ambulating. He had routine labs drawn as an outpatient showing a significantly elevated creatinine. Patient has been taking naproxen 500mg daily for several years due to his arthritis. Patient has had no vomiting or diarrhea, and his appetite is good with no decreased intake recently. Patient's father sees Dr. Ailin Brown for nephrology and his mother has requested he be consulted if needed as he is known to their family. 2018- patient was seen and examined on general medical floor. Case discussed with patient's mother at the bedside. According to the mother the patient seems to be doing better today and seems to be close to his baseline. Renal ultrasound last night showed right hydronephrosis. Urology was consulted. Vital signs are stable and blood pressure currently improved at 134/93 with a heart rate of 81 and temperature is 98.3. Patient BUN/creatinine is dramatically improved and currently 17/1.2 with IV fluids. TSH was 7.8 with a WBC of 8 and hemoglobin 13.1.         Histories / Home Medications / Allergies  Past Medical History:   Diagnosis Date    Anemia     Arthritis     [address]bral pals[facility] ([facility]     Impulse control disorder     Profound mental retardation     Thyroid disease      Past Surgical History:   Procedure Laterality Date    DENTAL SURGERY  10/09/2017    dental restorations/Dr. Kathie Burk     History reviewed. No pertinent family history. Social History     Socioeconomic History    Marital status: Single     Spouse name: Not on file    Number of children: Not on file    Years of education: Not on file    Highest education level: Not on file   Occupational History    Not on file   Social Needs    Financial resource strain: Not on file    Food insecurity:     Worry: Not on file     Inability: Not on file    Transportation needs:     Medical: Not on file     Non-medical: Not on file   Tobacco Use    Smoking status: Never Smoker    Smokeless tobacco: Never Used   Substance and Sexual Activity    Alcohol use: Never     Frequency: Never    Drug use: Never    Sexual activity: Never   Lifestyle    Physical activity:     Days per week: Not on file     Minutes per session: Not on file    Stress: Not on file   Relationships    Social connections:     Talks on phone: Not on file     Gets together: Not on file     Attends Congregation service: Not on file     Active member of club or organization: Not on file     Attends meetings of clubs or organizations: Not on file     Relationship status: Not on file    Intimate partner violence:     Fear of current or ex partner: Not on file     Emotionally abused: Not on file     Physically abused: Not on file     Forced sexual activity: Not on file   Other Topics Concern    Not on file   Social History Narrative    Not on file     Prior to Admission medications    Medication Sig Start Date End Date Taking?  Authorizing Provider   calcium-vitamin D (OSCAL-500) 500-200 MG-UNIT per tablet Take 1 tablet by mouth 2 times daily   Yes Historical Provider, MD saenznosides-docusate sodium (SENOKOT-S) 8.6-50 MG tablet Take 2 tablets by mouth daily   Yes Historical Provider, MD   acetaminophen (TYLENOL) 325 MG tablet Take 650 mg by mouth every 4 hours as needed for Pain   Yes Historical Provider, MD   bisacodyl (DULCOLAX) 10 MG suppository Place 10 mg rectally daily as needed for Constipation   Yes Historical Provider, MD   levothyroxine (SYNTHROID) 75 MCG tablet Take 75 mcg by mouth every morning (before breakfast)    Yes Historical Provider, MD   naproxen (NAPROSYN) 500 MG tablet Take 500 mg by mouth every morning (before breakfast)    Yes Historical Provider, MD   Multiple Vitamin (MULTI VITAMIN) TABS Take 1 tablet by mouth every morning (before breakfast)    Yes Historical Provider, MD   ferrous sulfate 325 (65 Fe) MG tablet Take 325 mg by mouth daily (with breakfast)   Yes Historical Provider, MD   magnesium hydroxide (MILK OF MAGNESIA) 400 MG/5ML suspension Take 30 mLs by mouth daily as needed for Constipation   Yes Historical Provider, MD   vitamin D3 (CHOLECALCIFEROL) 400 units TABS tablet Take 400 Units by mouth every morning (before breakfast)     Historical Provider, MD     Allergies: Erythromycin and Macrolides and ketolides    Review of Systems  All bolded are positive; please see HPI  Unable to obtain as patient is non-verbal and non-communicative    Physical Examination  BP (!) 134/93   Pulse 81   Temp 98 °F (36.7 °C)   Resp 18   Ht 5' 6\" (1.676 m)   Wt 154 lb (69.9 kg)   SpO2 97%   BMI 24.86 kg/m²   General: patient is awake, alert, and at his baseline functional status - nonverbal; they appear stated age and are cooperative during the examination; they are in no apparent distress and do not exhibit any labored breathing at this time  HEENT: unremarkable  Neck: supple with trachea midline and without obvious JVD or bruit  Cardiac: regular rate and rhythm without obvious murmur, rub, or gallop  Lungs: clear to auscultation bilaterally without wheezes, rhonchi, or rales  Abdomen: soft, nontender, and nondistended with normal active bowel sounds and without organomegaly  Extremities: atraumatic, without ulcers or edema  Neurologic: mental status is as above, cranial nerves are grossly intact, the patient moves all extremities spontaneously, and no focal deficits are appreciated on exam    Recent vitals, labs, and imaging results have been reviewed and are available in the electronic medical record. Assessment and Plan  Patient is a 40 y.o. male who presented with abnormal labs.   The active problem list is as follows:    Acute renal failure  Right hydronephrosis  Hx of cerebral palsy   Profound mental retardation   Hypothyroidism  Arthritis  Labile hypertension on admission which is currently controlled    -admitted to [facility]  -IV fluids, NS at 100 ml/hr  -nephrology, urology consulted  -strict I/Os  -home medications reviewed  -Patient was sent for cystoscopy today    · DVT prophylaxis with heparin-which is held at this time for surgery  · Pending surgical outcome with patient BUN/creatinine improved patient may be discharged home later today if okay with nephrology and urology    Excell Guardian, , PGY3  9/5/2019  9:38 AM

## 2019-09-05 NOTE — PROGRESS NOTES
P Quality Flow/Interdisciplinary Rounds Progress Note        Quality Flow Rounds held on September 5, 2019    Disciplines Attending:  Bedside Nurse, ,  and Nursing Unit Leadership    Therese Ortega was admitted on 9/4/2019  9:51 AM    Anticipated Discharge Date:  Expected Discharge Date: 09/07/19    Disposition:    Arnel Score:  Arnel Scale Score: 16    Readmission Risk              Risk of Unplanned Readmission:        6           Discussed patient goal for the day, patient clinical progression, and barriers to discharge.   The following Goal(s) of the Day/Commitment(s) have been identified:  OR planned for today      [facility]  September 5, 2019

## 2019-09-05 NOTE — CONSULTS
[address]                                  CONSULTATION    PATIENT NAME: Darin Anaya                 :        1975  MED REC NO:   [id number]                            ROOM:       1352  ACCOUNT NO:   [de-identified]                           ADMIT DATE: 2019  PROVIDER:     Milagros Pickett MD    CONSULT DATE:  2019    ADMITTING PHYSICIAN:  Juan Ramon Robbins DO    REASON FOR CONSULTATION:  Acute kidney injury. HISTORY OF PRESENT ILLNESS:  The patient is seen in consultation at the  request of Dr. Lisa Copeland. The patient is a 80-year-old  gentleman  with a prior history of mental retardation. He had outpatient labs  done, which revealed elevated serum creatinine. His serum creatinine  upon presentation was 3.1 mg/dL. Apparently, he had a normal serum  creatinine a few months ago. We do not have any other serum creatinine  available for comparison in the [facility]. Because of these elevated  labs, the patient was advised to come to the emergency room. The  patient himself is unable to provide any history. His parents have left  the hospital.  According to the nursing staff, the patient had no prior  issues with loss of appetite, nausea, vomiting or diarrhea. No fever or  chills. He has been on nonsteroidal anti-inflammatory agents in the  form of Naprosyn for an extended period of time. PAST MEDICAL HISTORY:  Significant for a history of developmental delay  with a history of cerebral palsy. History of anemia. History of  impulse-control disorder. History of hypothyroidism. PAST SURGICAL HISTORY:  Unremarkable other than dental surgery. FAMILY HISTORY:  Significant for history of chronic kidney disease and  hypertension in his parents. SOCIAL HISTORY:  The patient has no history of tobacco or alcohol use. He has developmental delay.     ALLERGIES:  The patient is allergic to ERYTHROMYCIN, OTHER MACROLIDES  and KETOLIDES. HOME MEDICATIONS:  The patient is on Os-Marco A with vitamin D, Tylenol,  Dulcolax, levothyroxine, naproxen 500 mg daily, multivitamins one tablet  daily, ferrous sulfate daily, milk of magnesia p.r.n. CURRENT MEDICATIONS AT [facility]:  The patient has been started on  normal saline at 100 mL/hr, Tylenol p.r.n., Dulcolax suppository p.r.n.,  calcium with vitamin D 500/200 twice a day, levothyroxine 75 mcg daily,  Lovenox 30 mg subcutaneously daily, milk of magnesia p.r.n., Zofran 4 mg  intravenous q.6 h. p.r.n., therapeutic multivitamins once a day, vitamin  D _____ units daily. REVIEW OF SYSTEMS:  Not available. The patient is unable to provide  review of systems. PHYSICAL EXAMINATION:  GENERAL:  The patient is awake and alert. VITAL SIGNS:  Blood pressure is 171/87, pulse 80, respiratory rate 20,  and temperature 98.1 degrees Fahrenheit. HEENT:  Head is normocephalic and atraumatic. Unable to test for  accommodation. Mouth and throat, dry oral mucosa. NECK:  Supple. No distention. No carotid bruits. CHEST:  Symmetrical.  LUNGS:  Vesicular breath sounds. Breath sounds decreased at the bases. No rales or rhonchi. HEART:  Regular rate and rhythm without any pericardial rub or gallop. ABDOMEN:  Soft, nontender. Normal bowel sounds. No rebound tenderness. No palpable masses. EXTREMITIES:  No pedal edema. LABORATORY DATA:  Lab data from today; sodium 142 mmol/L, potassium 5.0  mmol/L, chloride 105 mmol/L, CO2 of 27 mmol/L. BUN 46 mg/dL and  creatinine 3.1 mg/dL. Glucose 87 mg/dL. Calcium 9.2 mg/dL. Hemoglobin  12.9 gm/dL. Urinalysis, specific gravity 1.010, negative for glucose,  bilirubin, ketones, blood protein and nitrites. Trace of leukocyte  esterase. IMPRESSION:  1. Acute kidney injury of undetermined etiology. The patient probably  has acute kidney injury related to use of nonsteroidal anti-inflammatory  drugs. We will hydrate the patient. Check urine for sodium, creatinine  and chloride. Check a renal ultrasound. 2.  Hypertension. The patient with no prior history of hypertension. We will follow blood pressure readings and if needed start the patient  on antihypertensive therapy. PLAN:  Plan is to hold angiotensin II receptor blocking agents. Check  urine for sodium, creatine and chloride. Check renal ultrasound. Continue hydration. We will repeat labs later tonight. If the renal  function does not improve, the patient probably would benefit from going  off Lovenox and instructed to be on heparin. Rest of plans per orders. Thank you for allowing me to participate in the care of this patient. We will follow the patient with you.         Evette Junior MD    D: 09/04/2019 16:19:51       T: 09/04/2019 17:52:15     [email or web address]  Job#: [id number]     Doc#: [id number]    CC:

## 2019-09-05 NOTE — ANESTHESIA PRE PROCEDURE
Never Used   Substance Use Topics    Alcohol use: Never     Frequency: Never                                Counseling given: Not Answered      Vital Signs (Current):   Vitals:    09/04/19 1730 09/04/19 2030 09/05/19 0000 09/05/19 0720   BP:  (!) 117/92  (!) 134/93   Pulse:  94  81   Resp:  18  18   Temp:  99 °F (37.2 °C)  98 °F (36.7 °C)   TempSrc:  Axillary     SpO2: 94% 95% 98% 97%   Weight:       Height:                                                  BP Readings from Last 3 Encounters:   09/05/19 (!) 134/93   10/09/17 121/70       NPO Status:                                                                                 BMI:   Wt Readings from Last 3 Encounters:   09/04/19 154 lb (69.9 kg)   10/09/17 161 lb (73 kg)     Body mass index is 24.86 kg/m². CBC:   Lab Results   Component Value Date    WBC 8.0 09/05/2019    RBC 4.47 09/05/2019    HGB 13.1 09/05/2019    HCT 40.5 09/05/2019    MCV 90.6 09/05/2019    RDW 12.5 09/05/2019     09/05/2019       CMP:   Lab Results   Component Value Date     09/05/2019    K 4.0 09/05/2019     09/05/2019    CO2 24 09/05/2019    BUN 17 09/05/2019    CREATININE 1.2 09/05/2019    GFRAA >60 09/05/2019    LABGLOM >60 09/05/2019    GLUCOSE 97 09/05/2019    CALCIUM 8.7 09/05/2019       POC Tests: No results for input(s): POCGLU, POCNA, POCK, POCCL, POCBUN, POCHEMO, POCHCT in the last 72 hours.     Coags: No results found for: PROTIME, INR, APTT    HCG (If Applicable): No results found for: PREGTESTUR, PREGSERUM, HCG, HCGQUANT     ABGs: No results found for: PHART, PO2ART, IPT7EIQ, RQY5KRE, BEART, B2BASDBM     Type & Screen (If Applicable):  No results found for: [facility]    Anesthesia Evaluation    Airway:        Comment: Difficult to access-patient not cooperative   Dental:      Comment: Poor dentition    Pulmonary: breath sounds clear to auscultation      (-) not a current smoker                           Cardiovascular:            Rhythm: regular  Rate:

## 2019-09-05 NOTE — PROGRESS NOTES
Resting quietly at present scant amount scant drainage sang.  From penis head Report called to RN 3rd floor

## 2019-09-05 NOTE — H&P
9/5/2019 8:46 AM  Service: Urology  Group: [facility] (Trey/Carley/Georgi)    Karol Hill  [id number]     Chief Complaint: Patient was found to have azotemia on routine blood testing    History of Present Illness: The patient is a 40 y.o. male patient who presents with azotemia on blood testing. I talked to the patient's mother who said he has not had abdominal or back pain no fever chills. His appetite is been the same. No previous history of calculus disease personal or familial.  He is on vitamin D which could be accentuating his calcium absorption  I explained the CAT scan findings to the mother that there is at least a right hydronephrosis but I think there may be bilateral obstructing stones. I advised her that I would like to do cystoscopy retrograde pyelogram and insertion of stents. Once his renal functions improved we will taken back to surgery to bilateral ureteroscopy laser lithotripsy exchange stents.   When he is well the will remove the stents and that he should be back to his baseline  She was agreeable and I will place his name on the surgical schedule    Past Medical History:   Diagnosis Date    Anemia     Arthritis     Cerebral palsy ([address]     Impulse control disorder     Profound mental retardation     Thyroid disease        Past Surgical History:   Procedure Laterality Date    DENTAL SURGERY  10/09/2017    dental restorations/Dr. Yarely Meade       Medications Prior to Admission:    Medications Prior to Admission: calcium-vitamin D (OSCAL-500) 500-200 MG-UNIT per tablet, Take 1 tablet by mouth 2 times daily  sennosides-docusate sodium (SENOKOT-S) 8.6-50 MG tablet, Take 2 tablets by mouth daily  acetaminophen (TYLENOL) 325 MG tablet, Take 650 mg by mouth every 4 hours as needed for Pain  bisacodyl (DULCOLAX) 10 MG suppository, Place 10 mg rectally daily as needed for Constipation  levothyroxine (SYNTHROID) 75 MCG tablet, Take 75 mcg by mouth every morning (before breakfast) clubbing, cyanosis, or edema  Skin:  Warm and dry, no open lesions or rashes  Neuro: No motor or sensory deficits in the 4 quadrant extremities  Rectal: deferred  Genitalia: Exam deferred to the OR    Labs:   Recent Labs     09/04/19  1027   WBC 8.6   RBC 4.39   HGB 12.9   HCT 39.9   MCV 90.9   MCH 29.4   MCHC 32.3   RDW 12.6      MPV 8.2       Recent Labs     09/04/19  1027   CREATININE 3.1*       Images:  US RETROPERITONEAL COMPLETE   Final Result      CT ABDOMEN PELVIS WO CONTRAST    (Results Pending)       Assessment: Saroj Burch 40 y.o. male   Azotemia  Suspected bilateral obstructing ureteral calculi    Plan:    The mother's verbal permission by phone.   Cystoscopy retrograde pyelogram bilateral stent insertion  Mother is coming into the available for consultation    Electronically signed by Sia Ann MD on 9/5/2019 at 8:46 AM

## 2019-09-06 VITALS
BODY MASS INDEX: 24.75 KG/M2 | OXYGEN SATURATION: 96 % | TEMPERATURE: 98.7 F | SYSTOLIC BLOOD PRESSURE: 123 MMHG | HEIGHT: 66 IN | RESPIRATION RATE: 24 BRPM | HEART RATE: 96 BPM | DIASTOLIC BLOOD PRESSURE: 81 MMHG | WEIGHT: 154 LBS

## 2019-09-06 LAB
ANION GAP SERPL CALCULATED.3IONS-SCNC: 10 MMOL/L (ref 7–16)
BUN BLDV-MCNC: 15 MG/DL (ref 6–20)
CALCIUM SERPL-MCNC: 8.3 MG/DL (ref 8.6–10.2)
CHLORIDE BLD-SCNC: 107 MMOL/L (ref 98–107)
CO2: 21 MMOL/L (ref 22–29)
CREAT SERPL-MCNC: 1.2 MG/DL (ref 0.7–1.2)
GFR AFRICAN AMERICAN: >60
GFR NON-AFRICAN AMERICAN: >60 ML/MIN/1.73
GLUCOSE BLD-MCNC: 112 MG/DL (ref 74–99)
MAGNESIUM: 1.8 MG/DL (ref 1.6–2.6)
POTASSIUM SERPL-SCNC: 4.1 MMOL/L (ref 3.5–5)
SODIUM BLD-SCNC: 138 MMOL/L (ref 132–146)
URIC ACID, SERUM: 5.5 MG/DL (ref 3.4–7)
VITAMIN D 25-HYDROXY: 37 NG/ML (ref 30–100)

## 2019-09-06 PROCEDURE — 80048 BASIC METABOLIC PNL TOTAL CA: CPT

## 2019-09-06 PROCEDURE — 82306 VITAMIN D 25 HYDROXY: CPT

## 2019-09-06 PROCEDURE — 36415 COLL VENOUS BLD VENIPUNCTURE: CPT

## 2019-09-06 PROCEDURE — 6370000000 HC RX 637 (ALT 250 FOR IP): Performed by: UROLOGY

## 2019-09-06 PROCEDURE — 2580000003 HC RX 258: Performed by: UROLOGY

## 2019-09-06 PROCEDURE — 84550 ASSAY OF BLOOD/URIC ACID: CPT

## 2019-09-06 PROCEDURE — 83735 ASSAY OF MAGNESIUM: CPT

## 2019-09-06 RX ORDER — TAMSULOSIN HYDROCHLORIDE 0.4 MG/1
0.4 CAPSULE ORAL DAILY
Qty: 30 CAPSULE | Refills: 3 | Status: SHIPPED | OUTPATIENT
Start: 2019-09-07

## 2019-09-06 RX ORDER — PHENAZOPYRIDINE HYDROCHLORIDE 100 MG/1
100 TABLET, FILM COATED ORAL
Qty: 8 TABLET | Refills: 0 | Status: SHIPPED | OUTPATIENT
Start: 2019-09-06 | End: 2019-09-09

## 2019-09-06 RX ADMIN — SODIUM CHLORIDE: 9 INJECTION, SOLUTION INTRAVENOUS at 02:46

## 2019-09-06 RX ADMIN — VITAMIN D, TAB 1000IU (100/BT) 500 UNITS: 25 TAB at 06:00

## 2019-09-06 RX ADMIN — SENNOSIDES AND DOCUSATE SODIUM 2 TABLET: 8.6; 5 TABLET ORAL at 09:48

## 2019-09-06 RX ADMIN — MULTIPLE VITAMINS W/ MINERALS TAB 1 TABLET: TAB at 05:59

## 2019-09-06 RX ADMIN — LEVOTHYROXINE SODIUM 75 MCG: 75 TABLET ORAL at 05:59

## 2019-09-06 RX ADMIN — TAMSULOSIN HYDROCHLORIDE 0.4 MG: 0.4 CAPSULE ORAL at 09:51

## 2019-09-06 RX ADMIN — OYSTER SHELL CALCIUM WITH VITAMIN D 1 TABLET: 500; 200 TABLET, FILM COATED ORAL at 09:54

## 2019-09-06 RX ADMIN — PHENAZOPYRIDINE HYDROCHLORIDE 100 MG: 100 TABLET ORAL at 12:00

## 2019-09-06 RX ADMIN — PHENAZOPYRIDINE HYDROCHLORIDE 100 MG: 100 TABLET ORAL at 08:30

## 2019-09-06 NOTE — OP NOTE
[address]                                OPERATIVE REPORT    PATIENT NAME: Leisa Castro                 :        1975  MED REC NO:   [id number]                            ROOM:       3289  ACCOUNT NO:   [de-identified]                           ADMIT DATE: 2019  PROVIDER:     Daiana Noguera MD    DATE OF PROCEDURE:  2019    PREOPERATIVE DIAGNOSES:  Azotemia, bilateral ureteral calculi. POSTOPERATIVE DIAGNOSES:  Azotemia, bilateral ureteral calculi. OPERATIONS PERFORMED:  Cystopanendoscopy, retrograde pyelograms  bilateral, right ureteroscopy and laser of the right ureteral calculus. ANESTHESIA:  Sedation. CONDITION:  Stable. BLOOD LOSS:  None. COMPLICATIONS:  In the preop area because of him being mentally  challenged, he was given some Versed to relax him. He was apprehensive,  but the Versed seemed to cause an adverse reaction to the extent that  his mother had never seen him act this way, eventually ate Ketamine and  calmed down. It took some time to get the patient in a situation where  he could be rolled into the operating room for further assessment. Also, the complication was the stone was impacted on the right side and  I could not get by it with any wires. I had to use ureteroscopy to  fracture the stone and to get a stent in. SURGEON:  Daiana Noguera MD    OPERATIVE PROCEDURE:  The time-out was read by me, the anesthesia and  the operating staff, reviewed history, physical, allergy and medication. A 2 gm of Ancef given upon induction. The patient was placed in  lithotomy position and prepped and draped in usual fashion. As I  mentioned above, this patient was difficult to get to the operating  room. In the preop area, he had Versed and had an adverse reaction.   He  became more agitated to the extent that his mother had never seen him  like this and he was easily fractured, it was most  likely dihydrate stone. Indeed with a power setting of 1 and a pulse of  5, I was able to fracture the stone well. I do not think I had injured  the ureteral wall or perforated the ureteral wall in anyway. Eventually, I did get a ureteral catheter and a Glidewire into the renal  pelvis. The urine was grossly clear. Retrograde pyelogram demonstrated  a massively dilated collecting system. I eventually placed a 6 x 28  J-stent over the Glidewire in the renal and positioned with fluoroscopy  of the bladder under direct visualization. No strings on the stent. The patient then had a rectal, good anal tone, no hemorrhoids, no  masses, no impaction and the prostate is 15 gm in size, normal is 15-20. B and O suppository was inserted. No Stark catheter was used. This  patient will be observed until his renal function is stabilized. He  will need a repeat endoscopic procedure particularly for the left side  5-mm stones probably in the renal pelvis by now. On the right side,  there may be some small fragments, but I did think I fractured this  12-mm stone fairly adequately.         Susannah Arndt MD    D: 09/05/2019 14:58:19       T: 09/05/2019 15:50:12     [email or web address]  Job#: [id number]     Doc#: [id number]    CC:  Clarissa Bartholomew MD

## 2019-09-06 NOTE — PROGRESS NOTES
Department of Internal Medicine  Nephrology Attending Progress Note        SUBJECTIVE:  The patient keeps repeating he is mad, did have cystoscopy with laser lithotripsy and placement of bilateral renal cysts, ct does show a lot of stool     Physical Exam:    Vitals:    09/05/19 1630   BP:    Pulse:    Resp:    Temp:    SpO2: 96%       I/O last 24 hours:  Intake/Output inaccurate    Weight: not done    General Appearance:  awake, alert,  in no acute distress  Skin:  bilateral heel sores  Neck:  neck- supple, no mass, non-tender  Lungs:  Normal expansion. Clear to auscultation. No rales, rhonchi, or wheezing. Heart:  Heart regular rate and rhythm     Abdominal: Abdomen soft, non-tender. BS normal. No masses,  No organomegaly  Extremities: Extremities warm to touch, pink, with no edema.   Peripheral Pulses:  Normal    DATA:    CBC with Differential:    Lab Results   Component Value Date    WBC 8.0 09/05/2019    RBC 4.47 09/05/2019    HGB 13.1 09/05/2019    HCT 40.5 09/05/2019     09/05/2019    MCV 90.6 09/05/2019    MCH 29.3 09/05/2019    MCHC 32.3 09/05/2019    RDW 12.5 09/05/2019    LYMPHOPCT 21.2 09/05/2019    MONOPCT 6.4 09/05/2019    BASOPCT 0.6 09/05/2019    MONOSABS 0.51 09/05/2019    LYMPHSABS 1.70 09/05/2019    EOSABS 0.16 09/05/2019    BASOSABS 0.05 09/05/2019     BMP:    Lab Results   Component Value Date     09/05/2019    K 4.0 09/05/2019     09/05/2019    CO2 24 09/05/2019    BUN 17 09/05/2019    CREATININE 1.2 09/05/2019    CALCIUM 8.7 09/05/2019    GFRAA >60 09/05/2019    LABGLOM >60 09/05/2019    GLUCOSE 97 09/05/2019          tamsulosin  0.4 mg Oral Daily    phenazopyridine  100 mg Oral TID WC    levothyroxine  75 mcg Oral QAM AC    therapeutic multivitamin-minerals  1 tablet Oral QAM AC    ferrous sulfate  325 mg Oral Daily with breakfast    calcium-vitamin D  1 tablet Oral BID    sennosides-docusate sodium  2 tablet Oral Daily    vitamin D3  500 Units Oral QAM AC      sodium chloride 100 mL/hr at 09/05/19 0720     magnesium hydroxide, acetaminophen, bisacodyl, ondansetron    IMPRESSION/RECOMMENDATIONS:      Obstructive uropathy related to bilateral renal lithiasis  Cerebral palsy with developmental issues  Anemia will hold iron  Vit d def on supplementation, check level may be able to decrease supplementation      Mary Holliday MD  9/5/2019 8:15 PM

## 2019-09-06 NOTE — PLAN OF CARE
Problem: Falls - Risk of:  Goal: Will remain free from falls  Description  Will remain free from falls  9/6/2019 0931 by Dino Becker RN  Outcome: Met This Shift  Note:   PATIENT WILL WALK WITH BRACES ON LOWER LEGS     Problem: Falls - Risk of:  Goal: Absence of physical injury  Description  Absence of physical injury  9/6/2019 0931 by Dino Becker RN  Outcome: Met This Shift     Problem: Risk for Impaired Skin Integrity  Goal: Tissue integrity - skin and mucous membranes  Description  Structural intactness and normal physiological function of skin and  mucous membranes.   Outcome: Met This Shift  Note:   PATIENT WILL REMAIN FREE FROM ANY BREAKDOWN     Problem: Pain:  Goal: Pain level will decrease  Description  Pain level will decrease  Outcome: Met This Shift     Problem: Pain:  Goal: Pain level will decrease  Description  Pain level will decrease  Outcome: Met This Shift

## 2019-09-06 NOTE — DISCHARGE SUMMARY
but the patient had just received Pyridium. Patient did eat most of his breakfast this morning with no evidence of nausea or vomiting. Patient is urinating well. TSH was 7.8 yesterday but the free T4 is normal at 1.25. Temperature is 98.6 with a heart rate of 88 and respiratory rate of 20. O2 saturation 96% on room air. Blood pressure is 108 11//97. Histories / Home Medications / Allergies  Past Medical History:   Diagnosis Date    Anemia     Arthritis     Cerebral palsy ([address]     Impulse control disorder     Profound mental retardation     Thyroid disease      Past Surgical History:   Procedure Laterality Date    CYSTOSCOPY Right 9/5/2019    CYSTOSCOPY URETEROSCOPY RETROGRADE PYELOGRAMS LASER LITHOTRIPSY BILATERAL STENT INSERTION performed by Jonathan Baron MD at [address] [place] [address]  10/09/2017    dental restorations/Dr. Eve Wild     History reviewed. No pertinent family history.   Social History     Socioeconomic History    Marital status: Single     Spouse name: Not on file    Number of children: Not on file    Years of education: Not on file    Highest education level: Not on file   Occupational History    Not on file   Social Needs    Financial resource strain: Not on file    Food insecurity:     Worry: Not on file     Inability: Not on file    Transportation needs:     Medical: Not on file     Non-medical: Not on file   Tobacco Use    Smoking status: Never Smoker    Smokeless tobacco: Never Used   Substance and Sexual Activity    Alcohol use: Never     Frequency: Never    Drug use: Never    Sexual activity: Never   Lifestyle    Physical activity:     Days per week: Not on file     Minutes per session: Not on file    Stress: Not on file   Relationships    Social connections:     Talks on phone: Not on file     Gets together: Not on file     Attends [employer] service: Not on file     Active member of club or organization: Not on file     Attends meetings of clubs or organizations: Not on file     Relationship status: Not on file    Intimate partner violence:     Fear of current or ex partner: Not on file     Emotionally abused: Not on file     Physically abused: Not on file     Forced sexual activity: Not on file   Other Topics Concern    Not on file   Social History Narrative    Not on file     Prior to Admission medications    Medication Sig Start Date End Date Taking?  Authorizing Provider   calcium-vitamin D (OSCAL-500) 500-200 MG-UNIT per tablet Take 1 tablet by mouth 2 times daily   Yes Historical Provider, MD   sennosides-docusate sodium (SENOKOT-S) 8.6-50 MG tablet Take 2 tablets by mouth daily   Yes Historical Provider, MD   acetaminophen (TYLENOL) 325 MG tablet Take 650 mg by mouth every 4 hours as needed for Pain   Yes Historical Provider, MD   bisacodyl (DULCOLAX) 10 MG suppository Place 10 mg rectally daily as needed for Constipation   Yes Historical Provider, MD   levothyroxine (SYNTHROID) 75 MCG tablet Take 75 mcg by mouth every morning (before breakfast)    Yes Historical Provider, MD   naproxen (NAPROSYN) 500 MG tablet Take 500 mg by mouth every morning (before breakfast)    Yes Historical Provider, MD   Multiple Vitamin (MULTI VITAMIN) TABS Take 1 tablet by mouth every morning (before breakfast)    Yes Historical Provider, MD   ferrous sulfate 325 (65 Fe) MG tablet Take 325 mg by mouth daily (with breakfast)   Yes Historical Provider, MD   magnesium hydroxide (MILK OF MAGNESIA) 400 MG/5ML suspension Take 30 mLs by mouth daily as needed for Constipation   Yes Historical Provider, MD   vitamin D3 (CHOLECALCIFEROL) 400 units TABS tablet Take 400 Units by mouth every morning (before breakfast)     Historical Provider, MD     Allergies: Erythromycin and Macrolides and ketolides    Review of Systems  All bolded are positive; please see HPI  Unable to obtain as patient is non-verbal and non-communicative    Physical Examination  BP (!) 138/97   Pulse 88   Temp 98.6 °F (37 °C) (Axillary)   Resp 20   Ht 5' 6\" (1.676 m)   Wt 154 lb (69.9 kg)   SpO2 96%   BMI 24.86 kg/m²   General: patient is awake, alert, and at his baseline functional status - nonverbal; they appear stated age and are cooperative during the examination; they are in no apparent distress and do not exhibit any labored breathing at this time  HEENT: unremarkable  Neck: supple with trachea midline and without obvious JVD or bruit  Cardiac: regular rate and rhythm without obvious murmur, rub, or gallop  Lungs: clear to auscultation bilaterally without wheezes, rhonchi, or rales  Abdomen: soft, nontender, and nondistended with normal active bowel sounds and without organomegaly  Extremities: atraumatic, without ulcers or edema  Neurologic: mental status is as above, cranial nerves are grossly intact, the patient moves all extremities spontaneously, and no focal deficits are appreciated on exam    Recent vitals, labs, and imaging results have been reviewed and are available in the electronic medical record. Assessment and Plan  Patient is a 40 y.o. male who presented with abnormal labs.   The active problem list is as follows:    Acute renal failure-resolved  Right hydronephrosis secondary to stone-that is post cystoscopy with right ureteroscopy with laser of the right urethral calculus 9/5/2019  Hx of cerebral palsy   Profound mental retardation   Hypothyroidism  Arthritis  Labile hypertension on admission with occasional diastolic hypertension noted    Plan:  Discharge home under family's care  Prescription for Flomax 0.4 mg 1 daily  Prescription for Pyridium 100 mg 3 times daily x3 days  Patient to follow-up with urology  Patient follow-up with primary care physician in 1-2 weeks  Recommend following up and monitoring patient's blood pressure especially diastolic as an outpatient        Hero Thakur DO, PGY3  9/6/2019  9:35 AM

## 2019-09-06 NOTE — PROGRESS NOTES
Department of Internal Medicine  Nephrology Attending Progress Note        SUBJECTIVE:  The patient resting in bed, continues to say he is mad, no bowel movement documented, aids state he is taking fluids well remains on iv fluids    Physical Exam:    Vitals:    09/06/19 0815   BP: (!) 138/97   Pulse: 88   Resp: 20   Temp: 98.6 °F (37 °C)       I/O last 24 hours:  Intake/Output inaccurate:    Weight: not done    General Appearance:  awake, alert,  in no acute distress  Skin:  bilateral heel sores  Neck:  neck- supple, no mass, non-tender  Lungs:  Normal expansion. Clear to auscultation. No rales, rhonchi, or wheezing. Heart:  Heart regular rate and rhythm     Abdominal: Abdomen soft, non-tender. BS normal. No masses,  No organomegaly  Extremities: Extremities warm to touch, pink, with no edema.   Peripheral Pulses:  Normal    DATA:    CBC with Differential:    Lab Results   Component Value Date    WBC 8.0 09/05/2019    RBC 4.47 09/05/2019    HGB 13.1 09/05/2019    HCT 40.5 09/05/2019     09/05/2019    MCV 90.6 09/05/2019    MCH 29.3 09/05/2019    MCHC 32.3 09/05/2019    RDW 12.5 09/05/2019    LYMPHOPCT 21.2 09/05/2019    MONOPCT 6.4 09/05/2019    BASOPCT 0.6 09/05/2019    MONOSABS 0.51 09/05/2019    LYMPHSABS 1.70 09/05/2019    EOSABS 0.16 09/05/2019    BASOSABS 0.05 09/05/2019     BMP:    Lab Results   Component Value Date     09/06/2019    K 4.1 09/06/2019     09/06/2019    CO2 21 09/06/2019    BUN 15 09/06/2019    CREATININE 1.2 09/06/2019    CALCIUM 8.3 09/06/2019    GFRAA >60 09/06/2019    LABGLOM >60 09/06/2019    GLUCOSE 112 09/06/2019          tamsulosin  0.4 mg Oral Daily    phenazopyridine  100 mg Oral TID WC    levothyroxine  75 mcg Oral QAM AC    therapeutic multivitamin-minerals  1 tablet Oral QAM AC    calcium-vitamin D  1 tablet Oral BID    sennosides-docusate sodium  2 tablet Oral Daily    vitamin D3  500 Units Oral QAM AC      sodium chloride 100 mL/hr at 09/06/19 5333

## 2019-09-06 NOTE — CARE COORDINATION
SS Note/Discharge plan:  Contacted [facility] admissions liaison for [facility] [address] confirming ICF return for today at 2:[facility]pm [facility], vm message left for pt's mother [place] allis on home and cell phone # regarding transfer arrangements, nursing notified.  Electronically signed by NUZHAT Ocampo on 9/6/2019 at 10:48 AM

## 2019-09-06 NOTE — PLAN OF CARE
Problem: Falls - Risk of:  Goal: Will remain free from falls  Description  Will remain free from falls  9/6/2019 0931 by Madeleine Haro RN  Outcome: Met This Shift  Note:   PATIENT WILL WALK WITH BRACES ON LOWER LEGS     Problem: Falls - Risk of:  Goal: Absence of physical injury  Description  Absence of physical injury  9/6/2019 1442 by Madeleine Haro RN  Outcome: Completed     Problem: Falls - Risk of:  Goal: Absence of physical injury  Description  Absence of physical injury  9/6/2019 0931 by Madeleine Haro RN  Outcome: Met This Shift     Problem: Risk for Impaired Skin Integrity  Goal: Tissue integrity - skin and mucous membranes  Description  Structural intactness and normal physiological function of skin and  mucous membranes. 9/6/2019 1442 by Madeleine Haro RN  Outcome: Completed     Problem: Risk for Impaired Skin Integrity  Goal: Tissue integrity - skin and mucous membranes  Description  Structural intactness and normal physiological function of skin and  mucous membranes.   9/6/2019 0931 by Madeleine Haro RN  Outcome: Met This Shift  Note:   PATIENT WILL REMAIN FREE FROM ANY BREAKDOWN     Problem: Pain:  Goal: Pain level will decrease  Description  Pain level will decrease  9/6/2019 1442 by Madeleine Haro RN  Outcome: Completed     Problem: Pain:  Goal: Control of acute pain  Description  Control of acute pain  9/6/2019 1442 by Madeleine Haro RN  Outcome: Completed

## 2019-09-06 NOTE — PROGRESS NOTES
organization: None     Attends meetings of clubs or organizations: None     Relationship status: None    Intimate partner violence:     Fear of current or ex partner: None     Emotionally abused: None     Physically abused: None     Forced sexual activity: None   Other Topics Concern    None   Social History Narrative    None       IV:    sodium chloride 100 mL/hr at 09/06/19 0246       PRN: promethazine, magnesium hydroxide, acetaminophen, bisacodyl, ondansetron    Scheduled:    tamsulosin  0.4 mg Oral Daily    phenazopyridine  100 mg Oral TID WC    levothyroxine  75 mcg Oral QAM AC    therapeutic multivitamin-minerals  1 tablet Oral QAM AC    calcium-vitamin D  1 tablet Oral BID    sennosides-docusate sodium  2 tablet Oral Daily    vitamin D3  500 Units Oral QAM AC       Lab Results   Component Value Date     09/06/2019    K 4.1 09/06/2019    BUN 15 09/06/2019    CREATININE 1.2 09/06/2019        Lab Results   Component Value Date    HGB 13.1 09/05/2019    HCT 40.5 09/05/2019       No results found for: PSA      Physical Exam   Skin dry, without rashes  Respirations non-labored, intact  Abdomen soft, non-tender, non-distended. Active bowel sounds.   Alert and nonverbal         Assessment   Azotemia- resolved   bilateral obstructing ureteral calculi  12mm stone impacted at right pelvic brim/5mm sone left proximal ureter       POD # 1 CYSTOSCOPY URETEROSCOPY RETROGRADE PYELOGRAMS BILATERAL STENT INSERTION/right ureteroscopy right laser lithotripsy        Plan  Azotemia and leukocytosis resolved  Urine culture in progress   Continue flomax and pyridium   Will need second procedure in several weeks to remove left UPJ stone  Stents will remain in place until obstructions cleared   This can be arranged as outpatient procedure   Can be managed without a kilgore   Will check bladder scan to ensure he is able to empty his bladder     ZULLY Beverly  9/6/2019  9:29 AM

## 2019-09-06 NOTE — DISCHARGE INSTR - COC
STATUS:}    IV Access:  { CRYSTAL IV ACCESS:167907801}    Nursing Mobility/ADLs:  Walking   {P DME NGTE:174653233}  Transfer  {P DME FNYF:531858992}  Bathing  {P DME NTLO:221832189}  Dressing  {CHP DME OJPL:634807341}  Toileting  {P DME WALF:574513564}  Feeding  {[facility] DME OFPP:821416301}  Med Admin  {[facility] DME ZUHX:592315955}  Med Delivery   { CRYSTAL MED Delivery:732705468}    Wound Care Documentation and Therapy:        Elimination:  Continence:   · Bowel: {YES / OJ:35459}  · Bladder: {YES / VY:80238}  Urinary Catheter: {Urinary Catheter:936891372}   Colostomy/Ileostomy/Ileal Conduit: {YES / HD:13661}       Date of Last BM: ***    Intake/Output Summary (Last 24 hours) at 2019 1049  Last data filed at 2019 1017  Gross per 24 hour   Intake 1400 ml   Output 0 ml   Net 1400 ml     I/O last 3 completed shifts: In: 1000 [P.O.:200;  I.V.:800]  Out: 0     Safety Concerns:     [employer] Safety Concerns:900600329}    Impairments/Disabilities:      [employer] Impairments/Disabilities:526553681}    Nutrition Therapy:  Current Nutrition Therapy:   [employer] Diet List:964123433}    Routes of Feeding: {[facility] DME Other Feedings:437160223}  Liquids: {Slp liquid thickness:86064}  Daily Fluid Restriction: {P DME Yes amt example:334100813}  Last Modified Barium Swallow with Video (Video Swallowing Test): {Done Not Done PYXP:473974696}    Treatments at the Time of Hospital Discharge:   Respiratory Treatments: ***  Oxygen Therapy:  {Therapy; copd oxygen:97339}  Ventilator:    {[facility] Vent HRTE:019683046}    Rehab Therapies: {THERAPEUTIC INTERVENTION:9713592530}  Weight Bearing Status/Restrictions: [employer]  Weight Bearin}  Other Medical Equipment (for information only, NOT a DME order):  {EQUIPMENT:203130196}  Other Treatments: ***    Patient's personal belongings (please select all that are sent with patient):  {PORSHA DME Belongings:328811767}    RN SIGNATURE:  {Esignature:007979349}    CASE MANAGEMENT/SOCIAL WORK SECTION    Inpatient Status Date: 09/04/2019    Readmission Risk Assessment Score:  Readmission Risk              Risk of Unplanned Readmission:        7           Discharging to Facility/ Agency   · Name: [facility] [address]  · Address:  · Phone: [phone]  · Fax: [phone]    Dialysis Facility (if applicable)   · Name: NA  · Address:  · Dialysis Schedule:  · Phone:  · Fax:    / signature: Electronically signed by NUZHAT Tariq on 9/6/19 at 10:49 AM    PHYSICIAN SECTION    Prognosis: Fair    Condition at Discharge: Stable    Rehab Potential (if transferring to Rehab): Fair    Recommended Labs or Other Treatments After Discharge: ***    Physician Certification: I certify the above information and transfer of Sol Sleet  is necessary for the continuing treatment of the diagnosis listed and that he requires Intermediate/Mental Retardation/Developmental Disabilities Care for greater 30 days.      Update Admission H&P: {CHP DME Changes in WIICH:698002820}    PHYSICIAN SIGNATURE:  {Esignature:331749223}

## 2019-09-19 ENCOUNTER — PREP FOR PROCEDURE (OUTPATIENT)
Dept: UROLOGY | Age: 44
End: 2019-09-19

## 2019-09-19 RX ORDER — SODIUM CHLORIDE 0.9 % (FLUSH) 0.9 %
10 SYRINGE (ML) INJECTION PRN
Status: CANCELLED | OUTPATIENT
Start: 2019-09-19

## 2019-09-19 RX ORDER — SODIUM CHLORIDE 0.9 % (FLUSH) 0.9 %
10 SYRINGE (ML) INJECTION EVERY 12 HOURS SCHEDULED
Status: CANCELLED | OUTPATIENT
Start: 2019-09-19

## 2019-09-27 ENCOUNTER — ANESTHESIA EVENT (OUTPATIENT)
Dept: OPERATING ROOM | Age: 44
End: 2019-09-27
Payer: MEDICARE

## 2019-09-27 ENCOUNTER — APPOINTMENT (OUTPATIENT)
Dept: GENERAL RADIOLOGY | Age: 44
End: 2019-09-27
Attending: UROLOGY
Payer: MEDICARE

## 2019-09-27 ENCOUNTER — HOSPITAL ENCOUNTER (OUTPATIENT)
Age: 44
Setting detail: OUTPATIENT SURGERY
Discharge: HOME | End: 2019-09-27
Attending: UROLOGY | Admitting: UROLOGY
Payer: MEDICARE

## 2019-09-27 ENCOUNTER — ANESTHESIA (OUTPATIENT)
Dept: OPERATING ROOM | Age: 44
End: 2019-09-27
Payer: MEDICARE

## 2019-09-27 VITALS
HEART RATE: 111 BPM | SYSTOLIC BLOOD PRESSURE: 149 MMHG | DIASTOLIC BLOOD PRESSURE: 97 MMHG | HEIGHT: 65 IN | BODY MASS INDEX: 25.37 KG/M2 | OXYGEN SATURATION: 98 % | RESPIRATION RATE: 16 BRPM | WEIGHT: 152.25 LBS | TEMPERATURE: 96.9 F

## 2019-09-27 VITALS
OXYGEN SATURATION: 98 % | DIASTOLIC BLOOD PRESSURE: 90 MMHG | SYSTOLIC BLOOD PRESSURE: 133 MMHG | RESPIRATION RATE: 17 BRPM | TEMPERATURE: 98.6 F

## 2019-09-27 DIAGNOSIS — N20.0 KIDNEY STONES: ICD-10-CM

## 2019-09-27 LAB
ANION GAP SERPL CALCULATED.3IONS-SCNC: 7 MMOL/L (ref 7–16)
BUN BLDV-MCNC: 17 MG/DL (ref 6–20)
CALCIUM SERPL-MCNC: 8.5 MG/DL (ref 8.6–10.2)
CHLORIDE BLD-SCNC: 102 MMOL/L (ref 98–107)
CO2: 29 MMOL/L (ref 22–29)
CREAT SERPL-MCNC: 1.3 MG/DL (ref 0.7–1.2)
GFR AFRICAN AMERICAN: >60
GFR NON-AFRICAN AMERICAN: 60 ML/MIN/1.73
GLUCOSE BLD-MCNC: 100 MG/DL (ref 74–99)
HCT VFR BLD CALC: 40.6 % (ref 37–54)
HEMOGLOBIN: 12.9 G/DL (ref 12.5–16.5)
MCH RBC QN AUTO: 28.7 PG (ref 26–35)
MCHC RBC AUTO-ENTMCNC: 31.8 % (ref 32–34.5)
MCV RBC AUTO: 90.4 FL (ref 80–99.9)
PDW BLD-RTO: 12.6 FL (ref 11.5–15)
PLATELET # BLD: 242 E9/L (ref 130–450)
PMV BLD AUTO: 8.8 FL (ref 7–12)
POTASSIUM REFLEX MAGNESIUM: 4.1 MMOL/L (ref 3.5–5)
RBC # BLD: 4.49 E12/L (ref 3.8–5.8)
SODIUM BLD-SCNC: 138 MMOL/L (ref 132–146)
WBC # BLD: 7.3 E9/L (ref 4.5–11.5)

## 2019-09-27 PROCEDURE — 7100000010 HC PHASE II RECOVERY - FIRST 15 MIN: Performed by: UROLOGY

## 2019-09-27 PROCEDURE — 82365 CALCULUS SPECTROSCOPY: CPT

## 2019-09-27 PROCEDURE — 80048 BASIC METABOLIC PNL TOTAL CA: CPT

## 2019-09-27 PROCEDURE — 87186 SC STD MICRODIL/AGAR DIL: CPT

## 2019-09-27 PROCEDURE — 2500000003 HC RX 250 WO HCPCS: Performed by: ANESTHESIOLOGY

## 2019-09-27 PROCEDURE — 3600000013 HC SURGERY LEVEL 3 ADDTL 15MIN: Performed by: UROLOGY

## 2019-09-27 PROCEDURE — 6360000002 HC RX W HCPCS: Performed by: NURSE ANESTHETIST, CERTIFIED REGISTERED

## 2019-09-27 PROCEDURE — C1769 GUIDE WIRE: HCPCS | Performed by: UROLOGY

## 2019-09-27 PROCEDURE — 3600000003 HC SURGERY LEVEL 3 BASE: Performed by: UROLOGY

## 2019-09-27 PROCEDURE — 74400 UROGRAPHY IV +-KUB TOMOG: CPT

## 2019-09-27 PROCEDURE — C1894 INTRO/SHEATH, NON-LASER: HCPCS | Performed by: UROLOGY

## 2019-09-27 PROCEDURE — 87077 CULTURE AEROBIC IDENTIFY: CPT

## 2019-09-27 PROCEDURE — C1758 CATHETER, URETERAL: HCPCS | Performed by: UROLOGY

## 2019-09-27 PROCEDURE — 7100000000 HC PACU RECOVERY - FIRST 15 MIN: Performed by: UROLOGY

## 2019-09-27 PROCEDURE — 2709999900 HC NON-CHARGEABLE SUPPLY: Performed by: UROLOGY

## 2019-09-27 PROCEDURE — C2617 STENT, NON-COR, TEM W/O DEL: HCPCS | Performed by: UROLOGY

## 2019-09-27 PROCEDURE — 6370000000 HC RX 637 (ALT 250 FOR IP): Performed by: UROLOGY

## 2019-09-27 PROCEDURE — 85027 COMPLETE CBC AUTOMATED: CPT

## 2019-09-27 PROCEDURE — 88300 SURGICAL PATH GROSS: CPT

## 2019-09-27 PROCEDURE — 6360000002 HC RX W HCPCS: Performed by: UROLOGY

## 2019-09-27 PROCEDURE — 2720000010 HC SURG SUPPLY STERILE: Performed by: UROLOGY

## 2019-09-27 PROCEDURE — 87185 SC STD ENZYME DETCJ PER NZM: CPT

## 2019-09-27 PROCEDURE — 87088 URINE BACTERIA CULTURE: CPT

## 2019-09-27 PROCEDURE — 36415 COLL VENOUS BLD VENIPUNCTURE: CPT

## 2019-09-27 PROCEDURE — 2580000003 HC RX 258: Performed by: ANESTHESIOLOGY

## 2019-09-27 PROCEDURE — 6360000002 HC RX W HCPCS: Performed by: ANESTHESIOLOGY

## 2019-09-27 PROCEDURE — 3700000000 HC ANESTHESIA ATTENDED CARE: Performed by: UROLOGY

## 2019-09-27 PROCEDURE — 2580000003 HC RX 258: Performed by: NURSE ANESTHETIST, CERTIFIED REGISTERED

## 2019-09-27 PROCEDURE — 3700000001 HC ADD 15 MINUTES (ANESTHESIA): Performed by: UROLOGY

## 2019-09-27 PROCEDURE — 7100000001 HC PACU RECOVERY - ADDTL 15 MIN: Performed by: UROLOGY

## 2019-09-27 PROCEDURE — 7100000011 HC PHASE II RECOVERY - ADDTL 15 MIN: Performed by: UROLOGY

## 2019-09-27 DEVICE — UNIVERSA FIRM URETERAL STENT AND POSITIONER WITH HYDROPHILIC COATING
Type: IMPLANTABLE DEVICE | Site: URETER | Status: FUNCTIONAL
Brand: UNIVERSA

## 2019-09-27 RX ORDER — PHENAZOPYRIDINE HYDROCHLORIDE 200 MG/1
200 TABLET, FILM COATED ORAL 2 TIMES DAILY PRN
Qty: 20 TABLET | Refills: 0 | Status: SHIPPED | OUTPATIENT
Start: 2019-09-27 | End: 2019-11-04

## 2019-09-27 RX ORDER — LABETALOL 20 MG/4 ML (5 MG/ML) INTRAVENOUS SYRINGE
5 ONCE
Status: COMPLETED | OUTPATIENT
Start: 2019-09-27 | End: 2019-09-27

## 2019-09-27 RX ORDER — HYDRALAZINE HYDROCHLORIDE 20 MG/ML
10 INJECTION INTRAMUSCULAR; INTRAVENOUS ONCE
Status: COMPLETED | OUTPATIENT
Start: 2019-09-27 | End: 2019-09-27

## 2019-09-27 RX ORDER — SODIUM CHLORIDE 0.9 % (FLUSH) 0.9 %
10 SYRINGE (ML) INJECTION EVERY 12 HOURS SCHEDULED
Status: DISCONTINUED | OUTPATIENT
Start: 2019-09-27 | End: 2019-09-27 | Stop reason: HOSPADM

## 2019-09-27 RX ORDER — ONDANSETRON 2 MG/ML
4 INJECTION INTRAMUSCULAR; INTRAVENOUS EVERY 6 HOURS PRN
Status: DISCONTINUED | OUTPATIENT
Start: 2019-09-27 | End: 2019-09-27 | Stop reason: HOSPADM

## 2019-09-27 RX ORDER — ATROPA BELLADONNA AND OPIUM 16.2; 6 MG/1; MG/1
SUPPOSITORY RECTAL PRN
Status: DISCONTINUED | OUTPATIENT
Start: 2019-09-27 | End: 2019-09-27 | Stop reason: ALTCHOICE

## 2019-09-27 RX ORDER — MORPHINE SULFATE 2 MG/ML
2 INJECTION, SOLUTION INTRAMUSCULAR; INTRAVENOUS EVERY 4 HOURS PRN
Status: DISCONTINUED | OUTPATIENT
Start: 2019-09-27 | End: 2019-09-27 | Stop reason: HOSPADM

## 2019-09-27 RX ORDER — LABETALOL 20 MG/4 ML (5 MG/ML) INTRAVENOUS SYRINGE
Status: DISCONTINUED
Start: 2019-09-27 | End: 2019-09-27 | Stop reason: HOSPADM

## 2019-09-27 RX ORDER — SODIUM CHLORIDE 9 MG/ML
INJECTION, SOLUTION INTRAVENOUS CONTINUOUS PRN
Status: DISCONTINUED | OUTPATIENT
Start: 2019-09-27 | End: 2019-09-27 | Stop reason: SDUPTHER

## 2019-09-27 RX ORDER — PHENYLEPHRINE HYDROCHLORIDE 10 MG/ML
INJECTION INTRAVENOUS PRN
Status: DISCONTINUED | OUTPATIENT
Start: 2019-09-27 | End: 2019-09-27 | Stop reason: SDUPTHER

## 2019-09-27 RX ORDER — FENTANYL CITRATE 50 UG/ML
INJECTION, SOLUTION INTRAMUSCULAR; INTRAVENOUS PRN
Status: DISCONTINUED | OUTPATIENT
Start: 2019-09-27 | End: 2019-09-27 | Stop reason: SDUPTHER

## 2019-09-27 RX ORDER — OXYCODONE HYDROCHLORIDE AND ACETAMINOPHEN 5; 325 MG/1; MG/1
1 TABLET ORAL EVERY 4 HOURS PRN
Qty: 10 TABLET | Refills: 0 | Status: SHIPPED | OUTPATIENT
Start: 2019-09-27 | End: 2019-10-04

## 2019-09-27 RX ORDER — SODIUM CHLORIDE 0.9 % (FLUSH) 0.9 %
10 SYRINGE (ML) INJECTION PRN
Status: DISCONTINUED | OUTPATIENT
Start: 2019-09-27 | End: 2019-09-27 | Stop reason: HOSPADM

## 2019-09-27 RX ORDER — PROPOFOL 10 MG/ML
INJECTION, EMULSION INTRAVENOUS PRN
Status: DISCONTINUED | OUTPATIENT
Start: 2019-09-27 | End: 2019-09-27 | Stop reason: SDUPTHER

## 2019-09-27 RX ORDER — SODIUM CHLORIDE, SODIUM LACTATE, POTASSIUM CHLORIDE, CALCIUM CHLORIDE 600; 310; 30; 20 MG/100ML; MG/100ML; MG/100ML; MG/100ML
INJECTION, SOLUTION INTRAVENOUS CONTINUOUS
Status: DISCONTINUED | OUTPATIENT
Start: 2019-09-27 | End: 2019-09-27 | Stop reason: HOSPADM

## 2019-09-27 RX ORDER — HYDRALAZINE HYDROCHLORIDE 20 MG/ML
INJECTION INTRAMUSCULAR; INTRAVENOUS
Status: DISCONTINUED
Start: 2019-09-27 | End: 2019-09-27 | Stop reason: HOSPADM

## 2019-09-27 RX ORDER — CEPHALEXIN 500 MG/1
500 CAPSULE ORAL 2 TIMES DAILY
Qty: 14 CAPSULE | Refills: 0 | Status: SHIPPED | OUTPATIENT
Start: 2019-09-27 | End: 2019-10-04

## 2019-09-27 RX ORDER — OXYCODONE HYDROCHLORIDE AND ACETAMINOPHEN 5; 325 MG/1; MG/1
1 TABLET ORAL EVERY 6 HOURS PRN
Status: DISCONTINUED | OUTPATIENT
Start: 2019-09-27 | End: 2019-09-27 | Stop reason: HOSPADM

## 2019-09-27 RX ORDER — PROPOFOL 10 MG/ML
INJECTION, EMULSION INTRAVENOUS CONTINUOUS PRN
Status: DISCONTINUED | OUTPATIENT
Start: 2019-09-27 | End: 2019-09-27 | Stop reason: SDUPTHER

## 2019-09-27 RX ORDER — LIDOCAINE HYDROCHLORIDE 20 MG/ML
INJECTION, SOLUTION INTRAVENOUS PRN
Status: DISCONTINUED | OUTPATIENT
Start: 2019-09-27 | End: 2019-09-27 | Stop reason: SDUPTHER

## 2019-09-27 RX ADMIN — FENTANYL CITRATE 25 MCG: 50 INJECTION, SOLUTION INTRAMUSCULAR; INTRAVENOUS at 09:56

## 2019-09-27 RX ADMIN — PROPOFOL 20 MG: 10 INJECTION, EMULSION INTRAVENOUS at 08:37

## 2019-09-27 RX ADMIN — SODIUM CHLORIDE: 9 INJECTION, SOLUTION INTRAVENOUS at 09:50

## 2019-09-27 RX ADMIN — PROPOFOL 30 MG: 10 INJECTION, EMULSION INTRAVENOUS at 08:36

## 2019-09-27 RX ADMIN — LABETALOL 20 MG/4 ML (5 MG/ML) INTRAVENOUS SYRINGE 5 MG: at 11:04

## 2019-09-27 RX ADMIN — PROPOFOL 20 MG: 10 INJECTION, EMULSION INTRAVENOUS at 08:38

## 2019-09-27 RX ADMIN — Medication 2 G: at 08:36

## 2019-09-27 RX ADMIN — FENTANYL CITRATE 50 MCG: 50 INJECTION, SOLUTION INTRAMUSCULAR; INTRAVENOUS at 08:27

## 2019-09-27 RX ADMIN — FENTANYL CITRATE 50 MCG: 50 INJECTION, SOLUTION INTRAMUSCULAR; INTRAVENOUS at 09:14

## 2019-09-27 RX ADMIN — FENTANYL CITRATE 25 MCG: 50 INJECTION, SOLUTION INTRAMUSCULAR; INTRAVENOUS at 10:05

## 2019-09-27 RX ADMIN — PROPOFOL 140 MCG/KG/MIN: 10 INJECTION, EMULSION INTRAVENOUS at 08:35

## 2019-09-27 RX ADMIN — HYDRALAZINE HYDROCHLORIDE 10 MG: 20 INJECTION INTRAMUSCULAR; INTRAVENOUS at 10:41

## 2019-09-27 RX ADMIN — LIDOCAINE HYDROCHLORIDE 100 MG: 20 INJECTION, SOLUTION INTRAVENOUS at 08:35

## 2019-09-27 RX ADMIN — FENTANYL CITRATE 50 MCG: 50 INJECTION, SOLUTION INTRAMUSCULAR; INTRAVENOUS at 08:35

## 2019-09-27 RX ADMIN — PHENYLEPHRINE HYDROCHLORIDE 100 MCG: 10 INJECTION INTRAVENOUS at 08:46

## 2019-09-27 RX ADMIN — SODIUM CHLORIDE, POTASSIUM CHLORIDE, SODIUM LACTATE AND CALCIUM CHLORIDE: 600; 310; 30; 20 INJECTION, SOLUTION INTRAVENOUS at 08:27

## 2019-09-27 RX ADMIN — FENTANYL CITRATE 50 MCG: 50 INJECTION, SOLUTION INTRAMUSCULAR; INTRAVENOUS at 08:30

## 2019-09-27 RX ADMIN — PROPOFOL 50 MG: 10 INJECTION, EMULSION INTRAVENOUS at 08:35

## 2019-09-27 ASSESSMENT — PULMONARY FUNCTION TESTS
PIF_VALUE: 2
PIF_VALUE: 5
PIF_VALUE: 2
PIF_VALUE: 3
PIF_VALUE: 2
PIF_VALUE: 3
PIF_VALUE: 2
PIF_VALUE: 3
PIF_VALUE: 2
PIF_VALUE: 3
PIF_VALUE: 2

## 2019-09-27 ASSESSMENT — PAIN SCALES - GENERAL
PAINLEVEL_OUTOF10: 0

## 2019-09-27 ASSESSMENT — LIFESTYLE VARIABLES: SMOKING_STATUS: 0

## 2019-09-28 NOTE — ANESTHESIA POSTPROCEDURE EVALUATION
Department of Anesthesiology  Postprocedure Note    Patient: Yahaira Ariza  MRN: [id number]  YOB: 1975  Date of evaluation: 9/28/2019  Time:  7:55 AM     Procedure Summary     Date:  09/27/19 Room / Location:  [address] / [address]    Anesthesia Start:  Leroy Axe Anesthesia Stop:  3839    Procedure:  [place]. RETROGRADE. PYELOGRAMS. BILATERAL URETEROSCOPY. BILATERAL STENT INSERTION. LASER LITHOTRIPSY STONE EXTRACTION (Bilateral ) Diagnosis:  (KIDNEY STONES)    Surgeon:  Ilene Salvador MD Responsible Provider:  Kasia Stewart MD    Anesthesia Type:  MAC ASA Status:  3          Anesthesia Type: MAC    Kameron Phase I: Kameron Score: 10    Kameron Phase II: Kameron Score: 10    Last vitals: Reviewed and per EMR flowsheets.        Anesthesia Post Evaluation    Patient location during evaluation: PACU  Patient participation: complete - patient participated  Level of consciousness: awake  Airway patency: patent  Nausea & Vomiting: no nausea and no vomiting  Complications: no  Cardiovascular status: hemodynamically stable  Respiratory status: acceptable  Hydration status: stable

## 2019-10-03 LAB
CALCULI COMPOSITION: NORMAL
MASS: 4 MG
ORGANISM: ABNORMAL
STONE DESCRIPTION: NORMAL
STONE NUMBER: 1
STONE SIZE: NORMAL MM
URINE CULTURE, ROUTINE: ABNORMAL

## 2019-10-09 LAB
Lab: NORMAL
REPORT: NORMAL
THIS TEST SENT TO: NORMAL

## 2019-10-29 RX ORDER — SODIUM CHLORIDE 0.9 % (FLUSH) 0.9 %
10 SYRINGE (ML) INJECTION PRN
Status: CANCELLED | OUTPATIENT
Start: 2019-10-29

## 2019-10-29 RX ORDER — SODIUM CHLORIDE 0.9 % (FLUSH) 0.9 %
10 SYRINGE (ML) INJECTION EVERY 12 HOURS SCHEDULED
Status: CANCELLED | OUTPATIENT
Start: 2019-10-29

## 2019-11-04 RX ORDER — M-VIT,TX,IRON,MINS/CALC/FOLIC 27MG-0.4MG
1 TABLET ORAL DAILY
COMMUNITY

## 2019-11-06 ENCOUNTER — HOSPITAL ENCOUNTER (OUTPATIENT)
Age: 44
Setting detail: OUTPATIENT SURGERY
Discharge: OTHER INSTITUTION | End: 2019-11-06
Attending: UROLOGY | Admitting: UROLOGY
Payer: MEDICARE

## 2019-11-06 ENCOUNTER — APPOINTMENT (OUTPATIENT)
Dept: GENERAL RADIOLOGY | Age: 44
End: 2019-11-06
Attending: UROLOGY
Payer: MEDICARE

## 2019-11-06 ENCOUNTER — ANESTHESIA (OUTPATIENT)
Dept: OPERATING ROOM | Age: 44
End: 2019-11-06
Payer: MEDICARE

## 2019-11-06 ENCOUNTER — ANESTHESIA EVENT (OUTPATIENT)
Dept: OPERATING ROOM | Age: 44
End: 2019-11-06
Payer: MEDICARE

## 2019-11-06 VITALS
RESPIRATION RATE: 20 BRPM | WEIGHT: 152 LBS | DIASTOLIC BLOOD PRESSURE: 86 MMHG | SYSTOLIC BLOOD PRESSURE: 143 MMHG | OXYGEN SATURATION: 96 % | BODY MASS INDEX: 25.33 KG/M2 | HEART RATE: 82 BPM | TEMPERATURE: 97.3 F | HEIGHT: 65 IN

## 2019-11-06 VITALS — DIASTOLIC BLOOD PRESSURE: 77 MMHG | SYSTOLIC BLOOD PRESSURE: 108 MMHG | OXYGEN SATURATION: 93 %

## 2019-11-06 DIAGNOSIS — R52 PAIN: ICD-10-CM

## 2019-11-06 PROBLEM — N20.1 CALCULUS OF BOTH URETERS: Status: ACTIVE | Noted: 2019-11-06

## 2019-11-06 PROCEDURE — 74420 UROGRAPHY RTRGR +-KUB: CPT

## 2019-11-06 PROCEDURE — 7100000011 HC PHASE II RECOVERY - ADDTL 15 MIN: Performed by: UROLOGY

## 2019-11-06 PROCEDURE — 7100000010 HC PHASE II RECOVERY - FIRST 15 MIN: Performed by: UROLOGY

## 2019-11-06 PROCEDURE — 3700000000 HC ANESTHESIA ATTENDED CARE: Performed by: UROLOGY

## 2019-11-06 PROCEDURE — C1758 CATHETER, URETERAL: HCPCS | Performed by: UROLOGY

## 2019-11-06 PROCEDURE — 82365 CALCULUS SPECTROSCOPY: CPT

## 2019-11-06 PROCEDURE — 2709999900 HC NON-CHARGEABLE SUPPLY: Performed by: UROLOGY

## 2019-11-06 PROCEDURE — 3600000002 HC SURGERY LEVEL 2 BASE: Performed by: UROLOGY

## 2019-11-06 PROCEDURE — 88300 SURGICAL PATH GROSS: CPT

## 2019-11-06 PROCEDURE — 87186 SC STD MICRODIL/AGAR DIL: CPT

## 2019-11-06 PROCEDURE — 2580000003 HC RX 258: Performed by: UROLOGY

## 2019-11-06 PROCEDURE — 6370000000 HC RX 637 (ALT 250 FOR IP): Performed by: UROLOGY

## 2019-11-06 PROCEDURE — 87088 URINE BACTERIA CULTURE: CPT

## 2019-11-06 PROCEDURE — 6360000002 HC RX W HCPCS: Performed by: UROLOGY

## 2019-11-06 PROCEDURE — 87077 CULTURE AEROBIC IDENTIFY: CPT

## 2019-11-06 PROCEDURE — 6360000002 HC RX W HCPCS: Performed by: NURSE ANESTHETIST, CERTIFIED REGISTERED

## 2019-11-06 PROCEDURE — 3700000001 HC ADD 15 MINUTES (ANESTHESIA): Performed by: UROLOGY

## 2019-11-06 PROCEDURE — 3600000012 HC SURGERY LEVEL 2 ADDTL 15MIN: Performed by: UROLOGY

## 2019-11-06 RX ORDER — SODIUM CHLORIDE 9 MG/ML
INJECTION, SOLUTION INTRAVENOUS CONTINUOUS
Status: DISCONTINUED | OUTPATIENT
Start: 2019-11-06 | End: 2019-11-06 | Stop reason: HOSPADM

## 2019-11-06 RX ORDER — PROPOFOL 10 MG/ML
INJECTION, EMULSION INTRAVENOUS CONTINUOUS PRN
Status: DISCONTINUED | OUTPATIENT
Start: 2019-11-06 | End: 2019-11-06 | Stop reason: SDUPTHER

## 2019-11-06 RX ORDER — PROPOFOL 10 MG/ML
INJECTION, EMULSION INTRAVENOUS PRN
Status: DISCONTINUED | OUTPATIENT
Start: 2019-11-06 | End: 2019-11-06 | Stop reason: SDUPTHER

## 2019-11-06 RX ORDER — OXYCODONE HYDROCHLORIDE AND ACETAMINOPHEN 5; 325 MG/1; MG/1
1 TABLET ORAL EVERY 6 HOURS PRN
Status: DISCONTINUED | OUTPATIENT
Start: 2019-11-06 | End: 2019-11-06 | Stop reason: HOSPADM

## 2019-11-06 RX ORDER — SODIUM CHLORIDE 0.9 % (FLUSH) 0.9 %
10 SYRINGE (ML) INJECTION EVERY 12 HOURS SCHEDULED
Status: DISCONTINUED | OUTPATIENT
Start: 2019-11-06 | End: 2019-11-06 | Stop reason: HOSPADM

## 2019-11-06 RX ORDER — ONDANSETRON 2 MG/ML
4 INJECTION INTRAMUSCULAR; INTRAVENOUS EVERY 6 HOURS PRN
Status: DISCONTINUED | OUTPATIENT
Start: 2019-11-06 | End: 2019-11-06 | Stop reason: SDUPTHER

## 2019-11-06 RX ORDER — OXYCODONE HYDROCHLORIDE AND ACETAMINOPHEN 5; 325 MG/1; MG/1
1 TABLET ORAL EVERY 6 HOURS PRN
Status: DISCONTINUED | OUTPATIENT
Start: 2019-11-06 | End: 2019-11-06

## 2019-11-06 RX ORDER — PROMETHAZINE HYDROCHLORIDE 25 MG/ML
12.5 INJECTION, SOLUTION INTRAMUSCULAR; INTRAVENOUS
Status: DISCONTINUED | OUTPATIENT
Start: 2019-11-06 | End: 2019-11-06 | Stop reason: HOSPADM

## 2019-11-06 RX ORDER — ONDANSETRON 2 MG/ML
4 INJECTION INTRAMUSCULAR; INTRAVENOUS EVERY 6 HOURS PRN
Status: DISCONTINUED | OUTPATIENT
Start: 2019-11-06 | End: 2019-11-06 | Stop reason: HOSPADM

## 2019-11-06 RX ORDER — MORPHINE SULFATE 2 MG/ML
2 INJECTION, SOLUTION INTRAMUSCULAR; INTRAVENOUS EVERY 4 HOURS PRN
Status: DISCONTINUED | OUTPATIENT
Start: 2019-11-06 | End: 2019-11-06 | Stop reason: HOSPADM

## 2019-11-06 RX ORDER — CEPHALEXIN 500 MG/1
500 CAPSULE ORAL 2 TIMES DAILY
Qty: 10 CAPSULE | Refills: 1 | Status: SHIPPED | OUTPATIENT
Start: 2019-11-06 | End: 2019-11-11

## 2019-11-06 RX ORDER — MORPHINE SULFATE 2 MG/ML
1 INJECTION, SOLUTION INTRAMUSCULAR; INTRAVENOUS EVERY 5 MIN PRN
Status: DISCONTINUED | OUTPATIENT
Start: 2019-11-06 | End: 2019-11-06 | Stop reason: HOSPADM

## 2019-11-06 RX ORDER — CEFAZOLIN SODIUM 2 G/50ML
2 SOLUTION INTRAVENOUS
Status: COMPLETED | OUTPATIENT
Start: 2019-11-06 | End: 2019-11-06

## 2019-11-06 RX ORDER — LABETALOL HYDROCHLORIDE 5 MG/ML
10 INJECTION, SOLUTION INTRAVENOUS EVERY 10 MIN PRN
Status: DISCONTINUED | OUTPATIENT
Start: 2019-11-06 | End: 2019-11-06 | Stop reason: HOSPADM

## 2019-11-06 RX ORDER — SODIUM CHLORIDE 0.9 % (FLUSH) 0.9 %
10 SYRINGE (ML) INJECTION PRN
Status: DISCONTINUED | OUTPATIENT
Start: 2019-11-06 | End: 2019-11-06 | Stop reason: HOSPADM

## 2019-11-06 RX ORDER — MORPHINE SULFATE 2 MG/ML
2 INJECTION, SOLUTION INTRAMUSCULAR; INTRAVENOUS EVERY 4 HOURS PRN
Status: DISCONTINUED | OUTPATIENT
Start: 2019-11-06 | End: 2019-11-06

## 2019-11-06 RX ORDER — LIDOCAINE HYDROCHLORIDE 20 MG/ML
INJECTION, SOLUTION INTRAVENOUS PRN
Status: DISCONTINUED | OUTPATIENT
Start: 2019-11-06 | End: 2019-11-06 | Stop reason: SDUPTHER

## 2019-11-06 RX ORDER — ATROPA BELLADONNA AND OPIUM 16.2; 6 MG/1; MG/1
SUPPOSITORY RECTAL PRN
Status: DISCONTINUED | OUTPATIENT
Start: 2019-11-06 | End: 2019-11-06 | Stop reason: ALTCHOICE

## 2019-11-06 RX ORDER — FENTANYL CITRATE 50 UG/ML
INJECTION, SOLUTION INTRAMUSCULAR; INTRAVENOUS PRN
Status: DISCONTINUED | OUTPATIENT
Start: 2019-11-06 | End: 2019-11-06

## 2019-11-06 RX ADMIN — FENTANYL CITRATE 25 MCG: 50 INJECTION, SOLUTION INTRAMUSCULAR; INTRAVENOUS at 07:35

## 2019-11-06 RX ADMIN — FENTANYL CITRATE 50 MCG: 50 INJECTION, SOLUTION INTRAMUSCULAR; INTRAVENOUS at 08:00

## 2019-11-06 RX ADMIN — SODIUM CHLORIDE: 9 INJECTION, SOLUTION INTRAVENOUS at 07:29

## 2019-11-06 RX ADMIN — PROPOFOL 100 MCG/KG/MIN: 10 INJECTION, EMULSION INTRAVENOUS at 07:32

## 2019-11-06 RX ADMIN — CEFAZOLIN SODIUM 2 G: 2 SOLUTION INTRAVENOUS at 07:30

## 2019-11-06 RX ADMIN — PROPOFOL 50 MG: 10 INJECTION, EMULSION INTRAVENOUS at 07:32

## 2019-11-06 RX ADMIN — LIDOCAINE HYDROCHLORIDE 40 MG: 20 INJECTION, SOLUTION INTRAVENOUS at 07:32

## 2019-11-06 RX ADMIN — FENTANYL CITRATE 25 MCG: 50 INJECTION, SOLUTION INTRAMUSCULAR; INTRAVENOUS at 07:40

## 2019-11-06 RX ADMIN — PROPOFOL 30 MG: 10 INJECTION, EMULSION INTRAVENOUS at 07:40

## 2019-11-06 RX ADMIN — PROPOFOL 50 MG: 10 INJECTION, EMULSION INTRAVENOUS at 07:36

## 2019-11-06 ASSESSMENT — PAIN SCALES - WONG BAKER: WONGBAKER_NUMERICALRESPONSE: 2

## 2019-11-06 ASSESSMENT — PAIN - FUNCTIONAL ASSESSMENT: PAIN_FUNCTIONAL_ASSESSMENT: 0-10

## 2019-11-06 ASSESSMENT — PULMONARY FUNCTION TESTS: PIF_VALUE: 1

## 2019-11-10 LAB
CALCULI COMPOSITION: NORMAL
MASS: 33 MG
STONE DESCRIPTION: NORMAL
STONE NUMBER: 2
STONE SIZE: NORMAL MM

## 2019-11-12 LAB
ORGANISM: ABNORMAL
URINE CULTURE, ROUTINE: ABNORMAL

## 2022-10-25 ENCOUNTER — APPOINTMENT (OUTPATIENT)
Dept: CT IMAGING | Age: 47
End: 2022-10-25
Payer: MEDICARE

## 2022-10-25 ENCOUNTER — HOSPITAL ENCOUNTER (EMERGENCY)
Age: 47
Discharge: HOME | End: 2022-10-26
Attending: EMERGENCY MEDICINE
Payer: MEDICARE

## 2022-10-25 VITALS
WEIGHT: 154 LBS | HEIGHT: 66 IN | RESPIRATION RATE: 14 BRPM | HEART RATE: 83 BPM | DIASTOLIC BLOOD PRESSURE: 113 MMHG | SYSTOLIC BLOOD PRESSURE: 149 MMHG | OXYGEN SATURATION: 99 % | TEMPERATURE: 98.6 F | BODY MASS INDEX: 24.75 KG/M2

## 2022-10-25 DIAGNOSIS — H65.92 MIDDLE EAR EFFUSION, LEFT: ICD-10-CM

## 2022-10-25 DIAGNOSIS — H61.23 BILATERAL IMPACTED CERUMEN: ICD-10-CM

## 2022-10-25 DIAGNOSIS — S01.01XA LACERATION OF SCALP WITHOUT FOREIGN BODY, INITIAL ENCOUNTER: Primary | ICD-10-CM

## 2022-10-25 LAB
ANION GAP SERPL CALCULATED.3IONS-SCNC: 8 MMOL/L (ref 7–16)
BASOPHILS ABSOLUTE: 0.05 E9/L (ref 0–0.2)
BASOPHILS RELATIVE PERCENT: 0.7 % (ref 0–2)
BUN BLDV-MCNC: 18 MG/DL (ref 6–20)
CALCIUM SERPL-MCNC: 9.4 MG/DL (ref 8.6–10.2)
CHLORIDE BLD-SCNC: 106 MMOL/L (ref 98–107)
CO2: 28 MMOL/L (ref 22–29)
CREAT SERPL-MCNC: 1.2 MG/DL (ref 0.7–1.2)
EOSINOPHILS ABSOLUTE: 0.2 E9/L (ref 0.05–0.5)
EOSINOPHILS RELATIVE PERCENT: 2.8 % (ref 0–6)
GFR SERPL CREATININE-BSD FRML MDRD: >60 ML/MIN/1.73
GLUCOSE BLD-MCNC: 94 MG/DL (ref 74–99)
HCT VFR BLD CALC: 45 % (ref 37–54)
HEMOGLOBIN: 14.8 G/DL (ref 12.5–16.5)
IMMATURE GRANULOCYTES #: 0.02 E9/L
IMMATURE GRANULOCYTES %: 0.3 % (ref 0–5)
LACTIC ACID: 0.7 MMOL/L (ref 0.5–2.2)
LYMPHOCYTES ABSOLUTE: 1.44 E9/L (ref 1.5–4)
LYMPHOCYTES RELATIVE PERCENT: 20 % (ref 20–42)
MCH RBC QN AUTO: 30 PG (ref 26–35)
MCHC RBC AUTO-ENTMCNC: 32.9 % (ref 32–34.5)
MCV RBC AUTO: 91.1 FL (ref 80–99.9)
MONOCYTES ABSOLUTE: 0.74 E9/L (ref 0.1–0.95)
MONOCYTES RELATIVE PERCENT: 10.3 % (ref 2–12)
NEUTROPHILS ABSOLUTE: 4.75 E9/L (ref 1.8–7.3)
NEUTROPHILS RELATIVE PERCENT: 65.9 % (ref 43–80)
PDW BLD-RTO: 13.3 FL (ref 11.5–15)
PLATELET # BLD: 240 E9/L (ref 130–450)
PMV BLD AUTO: 9.2 FL (ref 7–12)
POTASSIUM SERPL-SCNC: 3.8 MMOL/L (ref 3.5–5)
RBC # BLD: 4.94 E12/L (ref 3.8–5.8)
SODIUM BLD-SCNC: 142 MMOL/L (ref 132–146)
WBC # BLD: 7.2 E9/L (ref 4.5–11.5)

## 2022-10-25 PROCEDURE — 90471 IMMUNIZATION ADMIN: CPT

## 2022-10-25 PROCEDURE — 36415 COLL VENOUS BLD VENIPUNCTURE: CPT

## 2022-10-25 PROCEDURE — 90714 TD VACC NO PRESV 7 YRS+ IM: CPT

## 2022-10-25 PROCEDURE — 6360000002 HC RX W HCPCS

## 2022-10-25 PROCEDURE — 80048 BASIC METABOLIC PNL TOTAL CA: CPT

## 2022-10-25 PROCEDURE — 72125 CT NECK SPINE W/O DYE: CPT

## 2022-10-25 PROCEDURE — 70450 CT HEAD/BRAIN W/O DYE: CPT

## 2022-10-25 PROCEDURE — 6360000004 HC RX CONTRAST MEDICATION: Performed by: RADIOLOGY

## 2022-10-25 PROCEDURE — 85025 COMPLETE CBC W/AUTO DIFF WBC: CPT

## 2022-10-25 PROCEDURE — 70491 CT SOFT TISSUE NECK W/DYE: CPT

## 2022-10-25 PROCEDURE — 83605 ASSAY OF LACTIC ACID: CPT

## 2022-10-25 PROCEDURE — 12001 RPR S/N/AX/GEN/TRNK 2.5CM/<: CPT

## 2022-10-25 PROCEDURE — 99285 EMERGENCY DEPT VISIT HI MDM: CPT

## 2022-10-25 RX ORDER — LIDOCAINE HYDROCHLORIDE AND EPINEPHRINE 10; 10 MG/ML; UG/ML
20 INJECTION, SOLUTION INFILTRATION; PERINEURAL ONCE
Status: DISCONTINUED | OUTPATIENT
Start: 2022-10-25 | End: 2022-10-26 | Stop reason: HOSPADM

## 2022-10-25 RX ORDER — FLUTICASONE PROPIONATE 50 MCG
1 SPRAY, SUSPENSION (ML) NASAL DAILY
Qty: 16 G | Refills: 0 | Status: SHIPPED | OUTPATIENT
Start: 2022-10-25

## 2022-10-25 RX ORDER — LIDOCAINE HYDROCHLORIDE 10 MG/ML
INJECTION, SOLUTION INFILTRATION; PERINEURAL
Status: DISPENSED
Start: 2022-10-25 | End: 2022-10-26

## 2022-10-25 RX ORDER — TETANUS AND DIPHTHERIA TOXOIDS ADSORBED 2; 2 [LF]/.5ML; [LF]/.5ML
0.5 INJECTION INTRAMUSCULAR ONCE
Status: COMPLETED | OUTPATIENT
Start: 2022-10-25 | End: 2022-10-25

## 2022-10-25 RX ORDER — FUROSEMIDE 20 MG/1
20 TABLET ORAL DAILY
COMMUNITY

## 2022-10-25 RX ADMIN — IOPAMIDOL 75 ML: 755 INJECTION, SOLUTION INTRAVENOUS at 20:02

## 2022-10-25 RX ADMIN — TETANUS AND DIPHTHERIA TOXOIDS ADSORBED 0.5 ML: 2; 2 INJECTION INTRAMUSCULAR at 18:01

## 2022-10-25 ASSESSMENT — LIFESTYLE VARIABLES
HOW MANY STANDARD DRINKS CONTAINING ALCOHOL DO YOU HAVE ON A TYPICAL DAY: PATIENT DOES NOT DRINK
HOW OFTEN DO YOU HAVE A DRINK CONTAINING ALCOHOL: NEVER

## 2022-10-25 NOTE — ED PROVIDER NOTES
ED Attending shared visit  CC: [address]  Department of Emergency Medicine   ED  Encounter Note  Admit Date/RoomTime: 10/25/2022  2:28 PM  ED Room: [facility]    NAME: Osvaldo Chin  : 1975  MRN: [id number]     Chief Complaint:  Laceration (Head laceration. 2 cm. No drainage )    History of Present Illness       Osvaldo Chin is a 52 y.o. old male presenting to the emergency department by private vehicle, for a laceration to the top of the scalp, caused by from an unknown object, which occurred at his workshop approximately 30 minute(s) prior to arrival.  There is not a possibility of retained foreign body in the affected area. Bleeding is  controlled. There is no pain at injury site. He takes no blood thinning agents. Patient was brought in by staff from [facility]. Per staff they noticed he had bleeding noted from the top of his head. Per staff patient was belted into his wheelchair, and that he did not have any falls. They state they are unsure of how he got the laceration. Patient is nonverbal, and  Tetanus Status:  up to date. ROS   Pertinent positives and negatives are stated within HPI, all other systems reviewed and are negative. Past Medical History:  has a past medical history of Anemia, Arthritis, Cerebral palsy ([address] Impulse control disorder, Osteoporosis, Profound mental retardation, and Thyroid disease. Surgical History:  has a past surgical history that includes Dental surgery (10/09/2017); Cystoscopy (Right, 2019); Cystoscopy (Bilateral, 2019); and CYSTOSCOPY INSERTION / REMOVAL STENT / STONE (Bilateral, 2019). Social History:  reports that he has never smoked. He has never used smokeless tobacco. He reports that he does not drink alcohol and does not use drugs. Family History: family history is not on file.      Allergies: Versed [midazolam], Erythromycin, and Macrolides and ketolides    Physical Exam  Physical Exam   Oxygen Saturation Interpretation: Normal.        ED Triage Vitals [10/25/22 1340]   BP Temp Temp src Heart Rate Resp SpO2 Height Weight   (!) 149/113 98.6 °F (37 °C) -- 83 14 99 % 5' 6\" (1.676 m) 154 lb (69.9 kg)         Constitutional:  Alert, development consistent with age. Head/Face: Approximately 2 cm laceration noted to the top of the scalp. Bleeding is controlled. No surrounding erythema or tenderness noted to the area. HEENT: Moderate amount of cerumen noted in the left ear canal with some mild erythema  int he external left ear canal after irrigation. Fluid noted behind the left TM. TM is not bulging or erythematous. Neck:  Normal ROM. Supple. Respiratory:  Clear to auscultation and breath sounds equal.    CV: Regular rate and rhythm, normal heart sounds, without pathological murmurs, ectopy, gallops, or rubs. GI:  Abdomen Soft, nontender, good bowel sounds. No firm or pulsatile mass. Integument:  No rashes, erythema present. Lymphatics: No lymphangitis or adenopathy noted. Neurological: Patient is nonverbal, and per staff patient is acting at his baseline. Patient is alert. Motor functions intact.     Lab / Imaging Results   (All laboratory and radiology results have been personally reviewed by myself)  Labs:  Results for orders placed or performed during the hospital encounter of 10/25/22   CBC with Auto Differential   Result Value Ref Range    WBC 7.2 4.5 - 11.5 E9/L    RBC 4.94 3.80 - 5.80 E12/L    Hemoglobin 14.8 12.5 - 16.5 g/dL    Hematocrit 45.0 37.0 - 54.0 %    MCV 91.1 80.0 - 99.9 fL    MCH 30.0 26.0 - 35.0 pg    MCHC 32.9 32.0 - 34.5 %    RDW 13.3 11.5 - 15.0 fL    Platelets 230 567 - 244 E9/L    MPV 9.2 7.0 - 12.0 fL    Neutrophils % 65.9 43.0 - 80.0 %    Immature Granulocytes % 0.3 0.0 - 5.0 %    Lymphocytes % 20.0 20.0 - 42.0 %    Monocytes % 10.3 2.0 - 12.0 %    Eosinophils % 2.8 0.0 - 6.0 %    Basophils % 0.7 0.0 - 2.0 %    Neutrophils Absolute 4.75 1.80 - 7.30 E9/L    Immature Granulocytes # 0.02 E9/L    Lymphocytes Absolute 1.44 (L) 1.50 - 4.00 E9/L    Monocytes Absolute 0.74 0.10 - 0.95 E9/L    Eosinophils Absolute 0.20 0.05 - 0.50 E9/L    Basophils Absolute 0.05 0.00 - 0.20 L1/I   Basic Metabolic Panel   Result Value Ref Range    Sodium 142 132 - 146 mmol/L    Potassium 3.8 3.5 - 5.0 mmol/L    Chloride 106 98 - 107 mmol/L    CO2 28 22 - 29 mmol/L    Anion Gap 8 7 - 16 mmol/L    Glucose 94 74 - 99 mg/dL    BUN 18 6 - 20 mg/dL    Creatinine 1.2 0.7 - 1.2 mg/dL    Est, Glom Filt Rate >60 >=60 mL/min/1.73    Calcium 9.4 8.6 - 10.2 mg/dL   Lactic Acid   Result Value Ref Range    Lactic Acid 0.7 0.5 - 2.2 mmol/L       Imaging: All Radiology results interpreted by Radiologist unless otherwise noted. CT SOFT TISSUE NECK W CONTRAST   Final Result   No acute abnormality of the soft tissue structures of the neck. No mass   lesion, abnormal collection or pathologically enlarged neck lymphadenopathy. Moderate opacification of left mastoid air cells and left middle ear cavity   is likely suggestive of left-sided otomastoiditis. There is also soft tissue   extending into the left external auditory canal.  It is unclear whether this   soft tissue represents inspissated cerumen or is extension of abnormality   within the left middle ear cavity. Superimposed cholesteatoma in this region   cannot be completely excluded      Complete opacification of left maxillary left ethmoid and left sphenoid   sinuses is likely related to obstruction of left-sided anterior sinonasal   passages. Left maxillary molar is located within the left maxillary sinus. RECOMMENDATIONS:   Unavailable         CT HEAD WO CONTRAST   Final Result   No acute intracranial abnormality. Extensive soft tissue densities in the paranasal sinuses on the left side   likely sinusitis. There is also left mastoiditis. Consider surveillance. CT cervical spine.       There is no acute fracture or dislocation in the cervical spine. The   prevertebral soft tissues are normal.  Mild degenerative changes are noted   from C3-T1. Impression      No acute fractures. CT CERVICAL SPINE WO CONTRAST   Final Result   No acute intracranial abnormality. Extensive soft tissue densities in the paranasal sinuses on the left side   likely sinusitis. There is also left mastoiditis. Consider surveillance. CT cervical spine. There is no acute fracture or dislocation in the cervical spine. The   prevertebral soft tissues are normal.  Mild degenerative changes are noted   from C3-T1. Impression      No acute fractures. ED Course / Medical Decision Making     Medications   lidocaine-EPINEPHrine 1 %-1:003903 injection 20 mL (has no administration in time range)   lidocaine 1 % injection (has no administration in time range)   diptheria-tetanus toxoids (DECAVAC) 2-2 LF/0.5ML injection 0.5 mL (0.5 mLs IntraMUSCular Given 10/25/22 1801)   iopamidol (ISOVUE-370) 76 % injection 75 mL (75 mLs IntraVENous Given 10/25/22 2002)        Consult(s):   None  7793-spoke with Dr. Carey Kyle with ENT resident regarding abnormal CT results, and he states that this is a middle ear effusion, and if there is no redness or warmth noted over the mastoid that this is not mastoid tenderness. Recommendation is for discharge with outpatient management and follow-up with ENT as needed. Procedure(s):   PROCEDURE NOTE  10/25/22       Time: 8948    LACERATION REPAIR  Risks, benefits and alternatives (for applicable procedures below) described. Performed By: LESLEY Lewis CNP. Informed consent: Verbal consent obtained. The Caregiver counseled regarding the procedure in person, it's indications, risks, potential complications and alternatives and any questions were answered. Verbal consent was obtained. Laceration #: 1. Location: left parietal scalp  Length: 2 cm.   The wound area was irrigated with sterile saline and draped in a sterile fashion. Local Anesthesia:  not required/indicated. The wound was explored with the following results: Thickness: superficial. no foreign body or tendon injury seen. Debridement: None. Undermining: None. Wound Margins Revised: None. Flaps Aligned: No.  The wound was closed with #2 staple(s). Dressing:  no dressing required. There were no additional wounds requiring formal closure. MDM:   Patient is a 55-year-old male who presents to the emergency department for a laceration to the scalp with unknown injury. Patient is mentally challenged, and earned verbal.  Per caregiver patient is at his baseline. Patient is alert. CT of the cervical spine shows there is no acute fracture or dislocation in the cervical spine. The prevertebral soft tissues are normal.  Mild degenerative changes are noted from C3-T1. No acute fractures. CT of the head shows no acute intracranial abnormality. With extensive soft tissue densities in the paranasal sinuses on the left side. Likely sinusitis. There is also left mastoiditis. Due to this finding blood work and a CT of the soft tissue neck with contrast was obtained. CT of the soft tissue neck shows no acute abnormality of the soft tissue structures of the neck. No masses or lesions. Abnormal collection or pathologically enlarged neck lymphadenopathy. Moderate opacification of left mastoid air cells and left middle ear cavities likely suggestive of left-sided Odo mastoiditis. There is also soft tissue extending at the left external auditory canal.  It is unclear whether this soft tissue represents inspissated cerumen or is extension of abnormality within the left middle ear. Patient did have a large amount of cerumen impacted in the left ear which was irrigated out. There is no erythema, warmth or tenderness noted over the left mastoid.   Spoke with ENT regarding abnormal CT results, and his recommendation is to start on Flonase, and follow-up with ENT as needed. Patient's blood work shows no evidence of a leukocytosis or anemia. Lactic acid is normal.  Patient appears well, nontoxic and per staff is at his baseline. Wound was thoroughly cleansed, and closed as described in procedure note above. Caregivers advised to follow-up in 3 to 5 days for staple remover with his PCP. Caregiver verbalized understanding. At this time the patient is without objective evidence of an acute process requiring hospitalization or inpatient management. They have remained hemodynamically stable throughout their entire ED visit and are stable for discharge with outpatient follow-up. The plan has been discussed in detail and they are aware of the specific conditions for emergent return, as well as the importance of follow-up. Plan of Care/Counseling:  LESLEY Jacobson CNP and EM Attending Physician reviewed today's visit with the patient and group home personnel in addition to providing specific details for the plan of care and counseling regarding the diagnosis and prognosis. Questions are answered at this time and are agreeable with the plan. Assessment      1. Laceration of scalp without foreign body, initial encounter    2. Middle ear effusion, left    3. Bilateral impacted cerumen      Plan   Discharged home. Patient condition is good    New Medications     New Prescriptions    FLUTICASONE (FLONASE) 50 MCG/ACT NASAL SPRAY    1 spray by Each Nostril route daily     Electronically signed by Priya Ron MD   DD: 10/25/22  **This report was transcribed using voice recognition software. Every effort was made to ensure accuracy; however, inadvertent computerized transcription errors may be present.   END OF ED PROVIDER NOTE       LESLEY Jacobson CNP  10/25/22 7900  ATTENDING PROVIDER ATTESTATION:     I have personally performed and/or participated in the history, exam, medical decision making, and procedures and agree with all pertinent clinical information. I have also reviewed and agree with the past medical, family and social history unless otherwise noted. My findings/Plan: Patient presenting here because of laceration to scalp. Patient reportedly hit the top of his head. Patient unable to give history. Patient does have history of cerebral palsy and MR. Patient does not ambulate per report. Patient here is awake alert. Noted staples in place in scalp. Patient moves upper extremities limb range of motion of lower extremities heart lung exam normal.  Patient's ears noted and there is noted cerumen impaction on the left ear as well as noted wax in the right ear canal as well. Patient labs are reviewed as well as CTs. Patient did undergo further imaging. We did consult ENT. Patient will be discharged with outpatient follow-up. Patient afebrile and nontoxic-appearing. Patient's ear canals were irrigated and significant amount of wax was removed. Caregiver at bedside notified of plan and patient will be discharged.        Zaida Munguia MD  10/25/22 4391       Zaida Munguia MD  10/25/22 4609

## 2022-10-25 NOTE — ED NOTES
Pt was at his workshop today, report he was restrained in a wheelchair and somehow ended up with a lac to the top of his head. Pt has a ~1cm lac to the top of his head. Pt has MRDD and is unable to communicate as to how he obtained the lac. Very little verbal communication. There is a caregiver from his group home that is bedside. She was not present at the workshop at time of injury. Currently not actively bleeding.       Deanna Nuñez RN  10/25/22 2808

## 2022-10-26 NOTE — ED NOTES
Report received from previous RN. Pt transported to CT at this time, caregiver at bedside.       Chirag Espana RN  10/25/22 2002

## 2022-10-26 NOTE — ED NOTES
Patient's place of residency, [address] [place] [address], contacted at this time to provide nurse to nurse report and update on patient's discharge plan and transport home. During conversation, [facility] nurse told this RN that if [facility] is unable to transport patient by [facility] may be able to send facility bus to retrieve patient. Charge RN updated.      Chirag Espana RN  10/26/22 6292

## 2022-10-26 NOTE — ED NOTES
Pt and patient caregiver provided with meal tray from department stock. Caregiver assists patient to feed self, baseline for patient. Denies any other needs at this time, will continue to monitor.      Vikas Shelton RN  10/26/22 8435

## 2022-10-26 NOTE — ED NOTES
Pt transport arranged with [facility], ETA approximately 0430 AM. Pt and caregiver notified, charge nurse aware.       Pee Guzman RN  10/25/22 [id number]

## 2022-10-26 NOTE — ED NOTES
Pt caregiver updated on [facility] ETA and provided with coffee. Pt remains sitting upright in bed, resting comfortably. Caregiver and patient deny any needs at this time. Will continue to monitor.       Karen Mcguire RN  10/26/22 [id number]

## (undated) DEVICE — CAMERA STRYKER 1488 HD GEN

## (undated) DEVICE — COVER,LIGHT HANDLE,FLX,1/PK: Brand: MEDLINE INDUSTRIES, INC.

## (undated) DEVICE — GUIDEWIRE ENDOSCP L150CM DIA0.035IN TIP 3CM PTFE NIT

## (undated) DEVICE — GOWN,SIRUS,FABRNF,XL,20/CS: Brand: MEDLINE

## (undated) DEVICE — DILATOR/SHEATH SET: Brand: 8/10 DILATOR/SHEATH SET

## (undated) DEVICE — TUBING, SUCTION, 1/4" X 10', STRAIGHT: Brand: MEDLINE

## (undated) DEVICE — URETEROSCOPE RIGID

## (undated) DEVICE — SOLUTION IV IRRIG WATER 1000ML POUR BRL 2F7114

## (undated) DEVICE — CATHETER URET 5FR L70CM OPN END SGL LUMN INJ HUB FLEXIMA

## (undated) DEVICE — KIT BEDSIDE REVITAL OX 500ML

## (undated) DEVICE — BAG DRNGE COMB PK

## (undated) DEVICE — TOWEL,OR,DSP,ST,BLUE,STD,6/PK,12PK/CS: Brand: MEDLINE

## (undated) DEVICE — GARMENT,MEDLINE,DVT,INT,CALF,MED, GEN2: Brand: MEDLINE

## (undated) DEVICE — CIRCON 30/70 URO LENS

## (undated) DEVICE — GUIDEWIRE URO L150CM DIA0.035IN TAPR 3CM NIT HYDRPHLC ANG

## (undated) DEVICE — Z INACTIVE USE 2660664 SOLUTION IRRIG 3000ML 0.9% SOD CHL USP UROMATIC PLAS CONT

## (undated) DEVICE — GAUZE,SPONGE,4"X4",16PLY,XRAY,STRL,LF: Brand: MEDLINE

## (undated) DEVICE — Z DISCONTINUED SUG SUB M0068405911 FIBER LSR 200UM ENDOSCP FLEXSHIELD HOLM HI PWR POLISHED

## (undated) DEVICE — Z INACTIVE USE 2635503 SOLUTION IRRIG 3000ML ST H2O USP UROMATIC PLAS CONT

## (undated) DEVICE — SYRINGE,TOOMEY,IRRIGATION,70CC,STERILE: Brand: MEDLINE

## (undated) DEVICE — CYSTO PACK: Brand: MEDLINE INDUSTRIES, INC.

## (undated) DEVICE — BASIC SINGLE BASIN 1-LF: Brand: MEDLINE INDUSTRIES, INC.

## (undated) DEVICE — CIRCON 21F CYSTO TRAY

## (undated) DEVICE — CATHETER URET OLV TIP 5FRX70CM FLEXIMA

## (undated) DEVICE — GOWN,SIRUS,NONRNF,SETINSLV,XL,20/CS: Brand: MEDLINE

## (undated) DEVICE — BAG DRAINAGE CONTAINER 15 LT FLUID COLLCTN

## (undated) DEVICE — Z DUP USE 2139333 GUIDEWIRE UROLOGICAL STR STD 0.035 IN X150 CM REG ZIPWIRE LF

## (undated) DEVICE — GLOVE SURG SZ 75 STD WHT LTX SYN POLYMER BEAD REINF ANTI RL

## (undated) DEVICE — READY WET SKIN SCRUB TRAY-LF: Brand: MEDLINE INDUSTRIES, INC.

## (undated) DEVICE — STONE RETRIEVAL BASKET: Brand: SEGURA HEMISPHERE

## (undated) DEVICE — GLOVE ORANGE PI 7 1/2   MSG9075

## (undated) DEVICE — MEDIA CONTRAST RX ISOVUE-300 61% 30ML VIALS